# Patient Record
Sex: MALE | Race: BLACK OR AFRICAN AMERICAN | Employment: FULL TIME | URBAN - METROPOLITAN AREA
[De-identification: names, ages, dates, MRNs, and addresses within clinical notes are randomized per-mention and may not be internally consistent; named-entity substitution may affect disease eponyms.]

---

## 2019-06-25 RX ORDER — LIDOCAINE 40 MG/G
CREAM TOPICAL
Status: CANCELLED | OUTPATIENT
Start: 2019-06-25

## 2019-06-25 RX ORDER — ONDANSETRON 2 MG/ML
4 INJECTION INTRAMUSCULAR; INTRAVENOUS
Status: CANCELLED | OUTPATIENT
Start: 2019-06-25

## 2019-07-29 ENCOUNTER — HOSPITAL ENCOUNTER (OUTPATIENT)
Facility: CLINIC | Age: 59
Discharge: HOME | End: 2019-07-29
Attending: COLON & RECTAL SURGERY | Admitting: COLON & RECTAL SURGERY
Payer: COMMERCIAL

## 2019-07-29 VITALS
OXYGEN SATURATION: 97 % | RESPIRATION RATE: 61 BRPM | SYSTOLIC BLOOD PRESSURE: 110 MMHG | WEIGHT: 190 LBS | DIASTOLIC BLOOD PRESSURE: 83 MMHG | HEART RATE: 52 BPM | HEIGHT: 74 IN | BODY MASS INDEX: 24.38 KG/M2

## 2019-07-29 LAB — COLONOSCOPY: NORMAL

## 2019-07-29 PROCEDURE — 45385 COLONOSCOPY W/LESION REMOVAL: CPT | Performed by: COLON & RECTAL SURGERY

## 2019-07-29 PROCEDURE — 88305 TISSUE EXAM BY PATHOLOGIST: CPT | Mod: 26 | Performed by: COLON & RECTAL SURGERY

## 2019-07-29 PROCEDURE — 99153 MOD SED SAME PHYS/QHP EA: CPT | Performed by: COLON & RECTAL SURGERY

## 2019-07-29 PROCEDURE — G0500 MOD SEDAT ENDO SERVICE >5YRS: HCPCS | Performed by: COLON & RECTAL SURGERY

## 2019-07-29 PROCEDURE — 88305 TISSUE EXAM BY PATHOLOGIST: CPT | Performed by: COLON & RECTAL SURGERY

## 2019-07-29 PROCEDURE — 25000128 H RX IP 250 OP 636: Performed by: COLON & RECTAL SURGERY

## 2019-07-29 RX ORDER — FENTANYL CITRATE 50 UG/ML
INJECTION, SOLUTION INTRAMUSCULAR; INTRAVENOUS PRN
Status: DISCONTINUED | OUTPATIENT
Start: 2019-07-29 | End: 2019-07-29 | Stop reason: HOSPADM

## 2019-07-29 RX ORDER — DIPHENHYDRAMINE HCL 25 MG
25 CAPSULE ORAL EVERY 4 HOURS PRN
Status: CANCELLED | OUTPATIENT
Start: 2019-07-29

## 2019-07-29 RX ORDER — LIDOCAINE 40 MG/G
CREAM TOPICAL
Status: DISCONTINUED | OUTPATIENT
Start: 2019-07-29 | End: 2019-07-29 | Stop reason: HOSPADM

## 2019-07-29 RX ORDER — NALOXONE HYDROCHLORIDE 0.4 MG/ML
.1-.4 INJECTION, SOLUTION INTRAMUSCULAR; INTRAVENOUS; SUBCUTANEOUS
Status: CANCELLED | OUTPATIENT
Start: 2019-07-29 | End: 2019-07-30

## 2019-07-29 RX ORDER — CHOLECALCIFEROL (VITAMIN D3) 50 MCG
1 TABLET ORAL DAILY
COMMUNITY
End: 2020-07-24

## 2019-07-29 RX ORDER — FLUMAZENIL 0.1 MG/ML
0.2 INJECTION, SOLUTION INTRAVENOUS
Status: CANCELLED | OUTPATIENT
Start: 2019-07-29 | End: 2019-07-29

## 2019-07-29 RX ORDER — DIPHENHYDRAMINE HYDROCHLORIDE 50 MG/ML
25 INJECTION INTRAMUSCULAR; INTRAVENOUS EVERY 4 HOURS PRN
Status: CANCELLED | OUTPATIENT
Start: 2019-07-29

## 2019-07-29 RX ORDER — MONTELUKAST SODIUM 10 MG/1
10 TABLET ORAL AT BEDTIME
COMMUNITY
End: 2020-07-30

## 2019-07-29 RX ORDER — PROCHLORPERAZINE MALEATE 10 MG
10 TABLET ORAL EVERY 6 HOURS PRN
Status: CANCELLED | OUTPATIENT
Start: 2019-07-29

## 2019-07-29 RX ORDER — ONDANSETRON 2 MG/ML
4 INJECTION INTRAMUSCULAR; INTRAVENOUS
Status: DISCONTINUED | OUTPATIENT
Start: 2019-07-29 | End: 2019-07-29 | Stop reason: HOSPADM

## 2019-07-29 RX ORDER — ONDANSETRON 4 MG/1
4 TABLET, ORALLY DISINTEGRATING ORAL EVERY 6 HOURS PRN
Status: CANCELLED | OUTPATIENT
Start: 2019-07-29

## 2019-07-29 RX ORDER — ONDANSETRON 2 MG/ML
4 INJECTION INTRAMUSCULAR; INTRAVENOUS EVERY 6 HOURS PRN
Status: CANCELLED | OUTPATIENT
Start: 2019-07-29

## 2019-07-29 ASSESSMENT — MIFFLIN-ST. JEOR: SCORE: 1761.58

## 2019-07-29 NOTE — OP NOTE
See Provation Note In Chart    Valorie Holt MD  [facility]  Office Phone # [phone]

## 2019-07-29 NOTE — H&P
"Pre-Endoscopy History and Physical     Stefano Merino MRN# [id number]   YOB: 1960 Age: 58 year old     Date of Procedure: 7/29/2019  Primary care provider: [facility], [facility]  Type of Endoscopy: Colonoscopy  Reason for Procedure: H/O polyps  Type of Anesthesia Anticipated: Moderate Sedation    HPI:    Stefano is a 58 year old male who will be undergoing the above procedure.      A history and physical has been performed. The patient's medications and allergies have been reviewed. The risks and benefits of the procedure and the sedation options and risks were discussed with the patient.  All questions were answered and informed consent was obtained.      He denies a personal or family history of anesthesia complications or bleeding disorders.     No Known Allergies       No current facility-administered medications on file prior to encounter.   Current Outpatient Medications on File Prior to Encounter:  montelukast (SINGULAIR) 10 MG tablet Take 10 mg by mouth At Bedtime   vitamin D3 (CHOLECALCIFEROL) 2000 units (50 mcg) tablet Take 1 tablet by mouth daily       There is no problem list on file for this patient.       Past Medical History:   Diagnosis Date     Colon polyps      Seasonal allergies         Past Surgical History:   Procedure Laterality Date     CHOLECYSTECTOMY      hx polyps     SOFT TISSUE SURGERY      benign lump removed from leg       Social History     Tobacco Use     Smoking status: Never Smoker     Smokeless tobacco: Current User   Substance Use Topics     Alcohol use: Yes     Comment: 1/week       Family History   Problem Relation Age of Onset     Breast Cancer Mother 75       REVIEW OF SYSTEMS:     5 point ROS negative except as noted above in HPI, including Gen., Resp., CV, GI &  system review.      PHYSICAL EXAM:   /80   Resp 16   Ht 1.88 m (6' 2\")   Wt 86.2 kg (190 lb)   SpO2 98%   BMI 24.39 kg/m   Estimated body mass index is 24.39 kg/m  as calculated from " "the following:    Height as of this encounter: 1.88 m (6' 2\").    Weight as of this encounter: 86.2 kg (190 lb).   GENERAL APPEARANCE: healthy and alert  MENTAL STATUS: alert  AIRWAY EXAM: Mallampatti Class I (visualization of the soft palate, fauces, uvula, anterior and posterior pillars)  RESP: lungs clear to auscultation - no rales, rhonchi or wheezes  CV: regular rates and rhythm      IMPRESSION   ASA Class 1 - Healthy patient, no medical problems        PLAN:     Plan for colonoscopy. We discussed the risks, benefits and alternatives and the patient wished to proceed.    The above has been forwarded to the consulting provider.      Valorie Holt MD  [facility]  Phone: [phone]  Fax: [phone]  July 29, 2019    "

## 2019-07-30 LAB — COPATH REPORT: NORMAL

## 2020-04-10 ENCOUNTER — TRANSFERRED RECORDS (OUTPATIENT)
Dept: HEALTH INFORMATION MANAGEMENT | Facility: CLINIC | Age: 60
End: 2020-04-10

## 2020-07-24 ENCOUNTER — VIRTUAL VISIT (OUTPATIENT)
Dept: INTERNAL MEDICINE | Facility: CLINIC | Age: 60
End: 2020-07-24
Payer: COMMERCIAL

## 2020-07-24 DIAGNOSIS — Z00.00 PREVENTATIVE HEALTH CARE: Primary | ICD-10-CM

## 2020-07-24 RX ORDER — DIPHENHYDRAMINE HCL 25 MG
25 CAPSULE ORAL EVERY 6 HOURS PRN
COMMUNITY
End: 2022-03-21

## 2020-07-24 RX ORDER — SILDENAFIL 100 MG/1
50 TABLET, FILM COATED ORAL
COMMUNITY
Start: 2020-02-03 | End: 2020-07-30

## 2020-07-24 RX ORDER — ALBUTEROL SULFATE 90 UG/1
AEROSOL, METERED RESPIRATORY (INHALATION)
COMMUNITY
Start: 2020-04-24 | End: 2022-03-21

## 2020-07-24 ASSESSMENT — ENCOUNTER SYMPTOMS
COUGH: 0
COUGH DISTURBING SLEEP: 0
COUGH DISTURBING SLEEP: 0
SHORTNESS OF BREATH: 1
NAIL CHANGES: 0
POSTURAL DYSPNEA: 0
COUGH DISTURBING SLEEP: 0
SPUTUM PRODUCTION: 0
SHORTNESS OF BREATH: 1
COUGH: 0
HEMOPTYSIS: 0
SNORES LOUDLY: 0
NAIL CHANGES: 0
HEMOPTYSIS: 0
WHEEZING: 0
SKIN CHANGES: 0
SNORES LOUDLY: 0
WHEEZING: 0
NAIL CHANGES: 0
HEMOPTYSIS: 0
COUGH DISTURBING SLEEP: 0
SKIN CHANGES: 0
POOR WOUND HEALING: 0
POOR WOUND HEALING: 0
SNORES LOUDLY: 0
POOR WOUND HEALING: 0
HEMOPTYSIS: 0
POSTURAL DYSPNEA: 0
DYSPNEA ON EXERTION: 0
SKIN CHANGES: 0
SHORTNESS OF BREATH: 1
SPUTUM PRODUCTION: 0
DYSPNEA ON EXERTION: 0
DYSPNEA ON EXERTION: 0
POSTURAL DYSPNEA: 0
COUGH: 0
POSTURAL DYSPNEA: 0
SPUTUM PRODUCTION: 0
SNORES LOUDLY: 0
SPUTUM PRODUCTION: 0
SHORTNESS OF BREATH: 1
COUGH: 0
DYSPNEA ON EXERTION: 0
POOR WOUND HEALING: 0
SKIN CHANGES: 0
WHEEZING: 0
WHEEZING: 0
NAIL CHANGES: 0

## 2020-07-24 ASSESSMENT — ANXIETY QUESTIONNAIRES
2. NOT BEING ABLE TO STOP OR CONTROL WORRYING: NOT AT ALL
GAD7 TOTAL SCORE: 0
1. FEELING NERVOUS, ANXIOUS, OR ON EDGE: NOT AT ALL
3. WORRYING TOO MUCH ABOUT DIFFERENT THINGS: NOT AT ALL
6. BECOMING EASILY ANNOYED OR IRRITABLE: NOT AT ALL
GAD7 TOTAL SCORE: 0
4. TROUBLE RELAXING: NOT AT ALL
6. BECOMING EASILY ANNOYED OR IRRITABLE: NOT AT ALL
GAD7 TOTAL SCORE: 0
GAD7 TOTAL SCORE: 0
7. FEELING AFRAID AS IF SOMETHING AWFUL MIGHT HAPPEN: NOT AT ALL
2. NOT BEING ABLE TO STOP OR CONTROL WORRYING: NOT AT ALL
5. BEING SO RESTLESS THAT IT IS HARD TO SIT STILL: NOT AT ALL
4. TROUBLE RELAXING: NOT AT ALL
1. FEELING NERVOUS, ANXIOUS, OR ON EDGE: NOT AT ALL
7. FEELING AFRAID AS IF SOMETHING AWFUL MIGHT HAPPEN: NOT AT ALL
7. FEELING AFRAID AS IF SOMETHING AWFUL MIGHT HAPPEN: NOT AT ALL
3. WORRYING TOO MUCH ABOUT DIFFERENT THINGS: NOT AT ALL
5. BEING SO RESTLESS THAT IT IS HARD TO SIT STILL: NOT AT ALL
7. FEELING AFRAID AS IF SOMETHING AWFUL MIGHT HAPPEN: NOT AT ALL

## 2020-07-24 NOTE — PROGRESS NOTES
Instructions prior to appointment:   1. Fast beginning at 10 pm for lab appointment  2. If your preventive care assessment package includes a Fitness Assessment, please bring athletic shoes. Complementary [facility] fitness attire is provided and yours to keep.  3. If eye exam, eyes may be dilated, it will last 4-6 hours, may want to bring sunglasses.   4. May bring laptop or other work materials for use during downtime.   5. You will receive an email about 3 days prior to your visit with a final itinerary, menu selections for the complementary breakfast and lunch and instructions for the visit.     Complimentary  Parking provided. Drop off car in front of [facility], take the patient elevators to the [facility]. When you enter in the lobby, identify yourself as an [facility] [atient and you will be escorted up to the clinic.   If questions arise prior to your appointment please contact the clinic at [phone].      Health Maintenance:  Do you have a PCP? Yes  When was your last visit with your PCP? 3 months  When was your last eye exam? 1 year ago  Have you ever had a colonoscopy? Yes   If yes, when? 7 years  Have you ever had any polyps removed? Yes       As part of your visit we will set up a DEXA scan which will measure your body composition. We have a few questions that need to be answered before we can schedule this scan:   What is your approximate weight? 197   Have you ever had a DEXA scan within the past 2 years? No   Will you have any other imaging studies with contrast (x-ray, CT scan) within 7  days of this appointment? No   Have you had any spine or hip surgery? No   Do you take any vitamins that contain calcium or antacids with calcium? No    If yes, stop taking 24 hours prior to visit.     Goals for the Visit:  1. Antibody Covid test  2. Comprehensive Preventative Exam  3. Cramps in legs and feet  4. Sleep concerns  Pertinent  past Medical/Family and Social HX: sister depression,   Pertinent sx that desire are addressed with this visit:           Answers for HPI/ROS submitted by the patient on 7/24/2020   SANJANA 7 TOTAL SCORE: 0  General Symptoms: No  Skin Symptoms: Yes  HENT Symptoms: No  EYE SYMPTOMS: No  HEART SYMPTOMS: No  LUNG SYMPTOMS: Yes  INTESTINAL SYMPTOMS: No  URINARY SYMPTOMS: No  REPRODUCTIVE SYMPTOMS: No  SKELETAL SYMPTOMS: No  BLOOD SYMPTOMS: No  NERVOUS SYSTEM SYMPTOMS: No  MENTAL HEALTH SYMPTOMS: No  Changes in hair: No  Changes in moles/birth marks: No  Itching: Yes  Rashes: No  Changes in nails: No  Acne: No  Change in facial hair: No  Warts: No  Non-healing sores: No  Scarring: No  Flaking of skin: No  Color changes of hands/feet in cold : No  Sun sensitivity: No  Skin thickening: No  Cough: No  Sputum or phlegm: No  Coughing up blood: No  Difficulty breating or shortness of breath: Yes  Snoring: No  Wheezing: No  Difficulty breathing on exertion: No  Nighttime Cough: No  Difficulty breathing when lying flat: No

## 2020-07-25 ASSESSMENT — ANXIETY QUESTIONNAIRES
GAD7 TOTAL SCORE: 0
GAD7 TOTAL SCORE: 0

## 2020-07-29 DIAGNOSIS — Z29.9 PREVENTIVE MEASURE: Primary | ICD-10-CM

## 2020-07-29 PROBLEM — E78.5 DYSLIPIDEMIA: Status: ACTIVE | Noted: 2020-07-29

## 2020-07-29 PROBLEM — J30.9 ALLERGIC RHINITIS: Status: ACTIVE | Noted: 2020-07-29

## 2020-07-29 PROBLEM — N52.9 ERECTILE DYSFUNCTION OF ORGANIC ORIGIN: Status: ACTIVE | Noted: 2019-07-26

## 2020-07-30 ENCOUNTER — OFFICE VISIT (OUTPATIENT)
Dept: DERMATOLOGY | Facility: CLINIC | Age: 60
End: 2020-07-30
Payer: COMMERCIAL

## 2020-07-30 ENCOUNTER — OFFICE VISIT (OUTPATIENT)
Dept: INTERNAL MEDICINE | Facility: CLINIC | Age: 60
End: 2020-07-30
Payer: COMMERCIAL

## 2020-07-30 ENCOUNTER — APPOINTMENT (OUTPATIENT)
Dept: INTERNAL MEDICINE | Facility: CLINIC | Age: 60
End: 2020-07-30
Payer: COMMERCIAL

## 2020-07-30 ENCOUNTER — OFFICE VISIT (OUTPATIENT)
Dept: OPHTHALMOLOGY | Facility: CLINIC | Age: 60
End: 2020-07-30
Payer: COMMERCIAL

## 2020-07-30 ENCOUNTER — OFFICE VISIT (OUTPATIENT)
Dept: GASTROENTEROLOGY | Facility: CLINIC | Age: 60
End: 2020-07-30
Payer: COMMERCIAL

## 2020-07-30 ENCOUNTER — OFFICE VISIT (OUTPATIENT)
Dept: AUDIOLOGY | Facility: CLINIC | Age: 60
End: 2020-07-30
Payer: COMMERCIAL

## 2020-07-30 ENCOUNTER — ANCILLARY PROCEDURE (OUTPATIENT)
Dept: BONE DENSITY | Facility: CLINIC | Age: 60
End: 2020-07-30
Payer: COMMERCIAL

## 2020-07-30 VITALS
HEART RATE: 65 BPM | WEIGHT: 195 LBS | HEIGHT: 74 IN | BODY MASS INDEX: 25.03 KG/M2 | SYSTOLIC BLOOD PRESSURE: 120 MMHG | DIASTOLIC BLOOD PRESSURE: 82 MMHG | RESPIRATION RATE: 18 BRPM | OXYGEN SATURATION: 98 %

## 2020-07-30 DIAGNOSIS — H52.13 MYOPIA OF BOTH EYES: Primary | ICD-10-CM

## 2020-07-30 DIAGNOSIS — Z00.00 PREVENTATIVE HEALTH CARE: ICD-10-CM

## 2020-07-30 DIAGNOSIS — Z01.10 EXAMINATION OF EARS AND HEARING: Primary | ICD-10-CM

## 2020-07-30 DIAGNOSIS — R09.89 AIR HUNGER: ICD-10-CM

## 2020-07-30 DIAGNOSIS — R05.9 COUGH: ICD-10-CM

## 2020-07-30 DIAGNOSIS — Z71.82 EXERCISE COUNSELING: Primary | ICD-10-CM

## 2020-07-30 DIAGNOSIS — J30.2 SEASONAL ALLERGIC RHINITIS, UNSPECIFIED TRIGGER: ICD-10-CM

## 2020-07-30 DIAGNOSIS — Z98.890 HX OF LASIK: ICD-10-CM

## 2020-07-30 DIAGNOSIS — Z29.9 PREVENTIVE MEASURE: ICD-10-CM

## 2020-07-30 DIAGNOSIS — Z12.83 SKIN CANCER SCREENING: Primary | ICD-10-CM

## 2020-07-30 DIAGNOSIS — R06.09 OTHER FORM OF DYSPNEA: ICD-10-CM

## 2020-07-30 DIAGNOSIS — Z00.00 ENCOUNTER FOR PREVENTIVE CARE: Primary | ICD-10-CM

## 2020-07-30 DIAGNOSIS — L30.9 DERMATITIS: ICD-10-CM

## 2020-07-30 DIAGNOSIS — I51.7 LAE (LEFT ATRIAL ENLARGEMENT): ICD-10-CM

## 2020-07-30 DIAGNOSIS — H25.093 AGE-RELATED INCIPIENT CATARACT OF BOTH EYES: ICD-10-CM

## 2020-07-30 LAB
ALBUMIN UR-MCNC: NEGATIVE MG/DL
ALP SERPL-CCNC: 54 U/L (ref 40–150)
ALT SERPL W P-5'-P-CCNC: 34 U/L (ref 0–70)
APPEARANCE UR: CLEAR
BASOPHILS # BLD AUTO: 0 10E9/L (ref 0–0.2)
BASOPHILS NFR BLD AUTO: 0.6 %
BILIRUB UR QL STRIP: NEGATIVE
CHOLEST SERPL-MCNC: 209 MG/DL
COLOR UR AUTO: YELLOW
CREAT SERPL-MCNC: 0.98 MG/DL (ref 0.66–1.25)
DEPRECATED CALCIDIOL+CALCIFEROL SERPL-MC: 39 UG/L (ref 20–75)
DIFFERENTIAL METHOD BLD: NORMAL
EOSINOPHIL # BLD AUTO: 0.4 10E9/L (ref 0–0.7)
EOSINOPHIL NFR BLD AUTO: 5.4 %
ERYTHROCYTE [DISTWIDTH] IN BLOOD BY AUTOMATED COUNT: 11.8 % (ref 10–15)
GFR SERPL CREATININE-BSD FRML MDRD: 85 ML/MIN/{1.73_M2}
GLUCOSE SERPL-MCNC: 95 MG/DL (ref 70–99)
GLUCOSE UR STRIP-MCNC: NEGATIVE MG/DL
HCT VFR BLD AUTO: 46.3 % (ref 40–53)
HCV AB SERPL QL IA: NONREACTIVE
HDLC SERPL-MCNC: 57 MG/DL
HGB BLD-MCNC: 15.4 G/DL (ref 13.3–17.7)
HGB UR QL STRIP: NEGATIVE
HIV 1+2 AB+HIV1 P24 AG SERPL QL IA: NONREACTIVE
IMM GRANULOCYTES # BLD: 0 10E9/L (ref 0–0.4)
IMM GRANULOCYTES NFR BLD: 0.1 %
INTERPRETATION ECG - MUSE: NORMAL
KETONES UR STRIP-MCNC: NEGATIVE MG/DL
LDLC SERPL CALC-MCNC: 137 MG/DL
LEUKOCYTE ESTERASE UR QL STRIP: NEGATIVE
LYMPHOCYTES # BLD AUTO: 1.4 10E9/L (ref 0.8–5.3)
LYMPHOCYTES NFR BLD AUTO: 19.5 %
MCH RBC QN AUTO: 29.8 PG (ref 26.5–33)
MCHC RBC AUTO-ENTMCNC: 33.3 G/DL (ref 31.5–36.5)
MCV RBC AUTO: 90 FL (ref 78–100)
MONOCYTES # BLD AUTO: 0.5 10E9/L (ref 0–1.3)
MONOCYTES NFR BLD AUTO: 6.5 %
NEUTROPHILS # BLD AUTO: 4.8 10E9/L (ref 1.6–8.3)
NEUTROPHILS NFR BLD AUTO: 67.9 %
NITRATE UR QL: NEGATIVE
NONHDLC SERPL-MCNC: 152 MG/DL
NRBC # BLD AUTO: 0 10*3/UL
NRBC BLD AUTO-RTO: 0 /100
PH UR STRIP: 7 PH (ref 5–7)
PLATELET # BLD AUTO: 235 10E9/L (ref 150–450)
PSA SERPL-ACNC: 0.94 UG/L (ref 0–4)
RBC # BLD AUTO: 5.16 10E12/L (ref 4.4–5.9)
RBC #/AREA URNS AUTO: <1 /HPF (ref 0–2)
SOURCE: NORMAL
SP GR UR STRIP: 1.02 (ref 1–1.03)
SQUAMOUS #/AREA URNS AUTO: <1 /HPF (ref 0–1)
TRIGL SERPL-MCNC: 73 MG/DL
TSH SERPL DL<=0.005 MIU/L-ACNC: 1.28 MU/L (ref 0.4–4)
UROBILINOGEN UR STRIP-MCNC: 0 MG/DL (ref 0–2)
WBC # BLD AUTO: 7.1 10E9/L (ref 4–11)
WBC #/AREA URNS AUTO: <1 /HPF (ref 0–5)

## 2020-07-30 RX ORDER — TRIAMCINOLONE ACETONIDE 0.25 MG/G
OINTMENT TOPICAL 2 TIMES DAILY
Qty: 80 G | Refills: 0 | Status: SHIPPED | OUTPATIENT
Start: 2020-07-30 | End: 2020-09-25

## 2020-07-30 RX ORDER — MONTELUKAST SODIUM 10 MG/1
10 TABLET ORAL AT BEDTIME
Qty: 30 TABLET | Refills: 2 | Status: SHIPPED | OUTPATIENT
Start: 2020-07-30 | End: 2021-07-07

## 2020-07-30 RX ORDER — TADALAFIL 20 MG/1
20 TABLET ORAL DAILY PRN
COMMUNITY
Start: 2020-07-30 | End: 2023-05-09

## 2020-07-30 ASSESSMENT — VISUAL ACUITY
OS_SC: 20/40
OS_PH_SC+: -
OD_SC: 20/50
OD_PH_SC: 20/40
OS_PH_SC: 20/30
METHOD: SNELLEN - LINEAR

## 2020-07-30 ASSESSMENT — REFRACTION_WEARINGRX
OS_CYLINDER: +0.25
OD_CYLINDER: SPHERE
OS_AXIS: 155
OS_SPHERE: -0.25
SPECS_TYPE: SVL
OD_SPHERE: -0.50

## 2020-07-30 ASSESSMENT — CUP TO DISC RATIO
OS_RATIO: 0.2
OD_RATIO: 0.2

## 2020-07-30 ASSESSMENT — REFRACTION_MANIFEST
OD_ADD: +1.50
OD_CYLINDER: SPHERE
OS_SPHERE: -0.50
OS_ADD: +1.50
OS_CYLINDER: SPHERE
OD_SPHERE: -0.75

## 2020-07-30 ASSESSMENT — TONOMETRY
IOP_METHOD: ICARE
OD_IOP_MMHG: 12
OS_IOP_MMHG: 13

## 2020-07-30 ASSESSMENT — EXTERNAL EXAM - LEFT EYE: OS_EXAM: NORMAL

## 2020-07-30 ASSESSMENT — CONF VISUAL FIELD
OS_NORMAL: 1
METHOD: COUNTING FINGERS
OD_NORMAL: 1

## 2020-07-30 ASSESSMENT — PAIN SCALES - GENERAL
PAINLEVEL: NO PAIN (0)
PAINLEVEL: NO PAIN (0)

## 2020-07-30 ASSESSMENT — EXTERNAL EXAM - RIGHT EYE: OD_EXAM: NORMAL

## 2020-07-30 ASSESSMENT — SLIT LAMP EXAM - LIDS
COMMENTS: NORMAL
COMMENTS: NORMAL

## 2020-07-30 ASSESSMENT — MIFFLIN-ST. JEOR: SCORE: 1773.26

## 2020-07-30 NOTE — LETTER
7/30/2020       RE: Stefano Merino  [address]  [place] [address]     Dear Colleague,    Thank you for referring your patient, Stefano Merino, to the [facility] DERMATOLOGY at [facility]. Please see a copy of my visit note below.    [facility] Dermatology Note      Dermatology Problem List:  1.Dermatitis - right forearm - triamcinolone 0.025% cream    CC:   Chief Complaint   Patient presents with     Skin Check     Stefano is here for a skin check-one spot of concern on right arm.      Encounter Date: Jul 30, 2020    History of Present Illness:  Mr. Stefano Merino is a 59 year old male who presents for a skin exam. He is a new patient. He has one spot of concern on the R arm. Notes it began as a probably bug bite about 1 mo ago. It is now an itchy patch on the R forearm. He has tried benadryl pills and cream, as well as vaseline but does not help a lot. He has not been super consistent with it. No hx of eczema. Does have seasonal allergies. No personal or fhx of skin cancer. He has no other spots or moles of concern he is well otherwise with no additional skin concerns. The patient denies painful, itching, tingling or bleeding lesions unless otherwise noted.    Past Medical History:   Patient Active Problem List   Diagnosis     Erectile dysfunction of organic origin     Allergic rhinitis     Dyslipidemia     Past Medical History:   Diagnosis Date     Colon polyps      Seasonal allergies      Past Surgical History:   Procedure Laterality Date     CHOLECYSTECTOMY      hx polyps     SOFT TISSUE SURGERY      benign lump removed from leg       Social History:  The patient works. Has children    Family History:  There is no family history of skin cancer. and There is no family history of melanoma.    Medications:  Current Outpatient Medications   Medication Sig Dispense Refill     albuterol (VENTOLIN HFA) 108 (90 Base) MCG/ACT inhaler        Cholecalciferol  (VITAMIN D3) 25 MCG (1000 UT) CAPS Take 1,000 Units by mouth       diphenhydrAMINE (BENADRYL) 25 MG capsule Take 25 mg by mouth every 6 hours as needed for itching or allergies       fluticasone-vilanterol (BREO ELLIPTA) 100-25 MCG/INH inhaler Inhale 1 puff into the lungs       montelukast (SINGULAIR) 10 MG tablet Take 10 mg by mouth At Bedtime       sildenafil (VIAGRA) 100 MG tablet Take 50 mg by mouth       No Known Allergies      Review of Systems:  -Constitutional: The patient denies fatigue, fevers, chills, unintended weight loss, and night sweats.  -HEENT: Patient denies nonhealing oral sores.  -Skin: As above in HPI. No additional skin concerns.    Physical exam:  Vitals: There were no vitals taken for this visit.  GEN: This is a well developed, well-nourished male in no acute distress, in a pleasant mood.    SKIN: Full skin, which includes the head/face, both arms, chest, back, abdomen,both legs, genitalia and/or groin buttocks, digits and/or nails, was examined.  -There are pink scaly patches and plaques on the right volar forearm.  -Bailey's skin type III, less than 100 nevi  -No other lesions of concern on areas examined.     Impression/Plan:  1. 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 Edu on steroid atrophy given as well.   -Cerave/Cetaphil moisturizer BID    2. Skin Cancer Screening  -ABCDs of melanoma were discussed and self skin checks were advised.   -Sun precaution was advised including the use of sun screens of SPF 30 or higher, sun protective clothing, and avoidance of tanning beds.  -Recommended annual skin exams    CC Dr. Newton on close of this encounter.  Follow-up in 1 year, earlier for new or changing lesions.       Staff Involved:  Staff Only  All risks, benefits and alternatives were  discussed with patient.  Patient is in agreement and understands the assessment and plan.  All questions were answered.    Dana Martin PA-C, [facility]  [facility]: Phone: [phone], Fax: [phone]  [facility]: Phone: [phone],  Fax: [phone]    Again, thank you for allowing me to participate in the care of your patient.      Sincerely,    Dana Martin PA-C

## 2020-07-30 NOTE — PATIENT INSTRUCTIONS
It was nice meeting you today. Below are the nutrition recommendations we discussed at your visit today.  Please let me know if you have any additional questions.    Nutrition Recommendations:    1. Continue eating 3 balanced meals per day.    2. Limit snacking and only have a planned snack when going a long stretch between meals and/or if truly hungry.    -To help with some weight loss/maintaining weight, can start with limiting the number of nights you have frozen yogurt at night to help cut back on some calories.    -Can try having a caffeine free herbal tea such as Tazo Glazed lemon loaf tea (only has a few calories and can be in place of dessert).     3. Increase water to aim for drinking at least 6-8 cups (48 oz-64 oz) water per day.   -Note: carbonated beverages/medrano do not provide oz for oz what still water does because the bubbles are taking up some volume.     4. Can keep daily food and beverage journals using a cell phone flaquito such as [employer] Fitness pal or Lose it or other tracking flaquito or online option of your choice. This will help give you a good picture of your intake day to day and help make any adjustments as you try to lose/maintain weight.    Lauren Crane, MS, RD, LD

## 2020-07-30 NOTE — LETTER
"    7/30/2020         RE: Stefano Merino  [address]  [place] [address]        Dear Colleague,    Thank you for referring your patient, Stefano Merino, to the [facility] GASTROENTEROLOGY AND IBD CLINIC. Please see a copy of my visit note below.     [facility] Outpatient Medical Nutrition Therapy      Time Spent:  60 minuts  Session Type:  Initial visit  Referral Source: [employer] Package/Dr. Kyle Floyd  Reason for RD Visit:   Nutritional counseling     Nutrition Assessment:  Patient is a 59 y.o. male with history of colon polyps, seasonal allergies.  Patient stated that he overall feels like he has a very healthy diet.  He stated that one issue is his cholesterol may be a little bit higher.  He does not have a lot of specific nutrition goals at visit today.  Although he would like to lose so interested in general nutrition recommendations as well as some to help with weight loss/management.  He stated that 2-3 nights per week he is away from home traveling for work and on those nights he may eat more past eat out more.  Otherwise use 3 meals a day about 2-3 snacks each day [employer] of frozen yogurt sometimes adds blueberries onto it other times may only have blueberries.  Admits drinking about 48 ounces of water.  He also complains of issues with leg cramps so will drink some tonic water. He also has some insomnia so he tries not to drink his Perlita antioxidant drinks in the evening or too close to bedtime since they have caffeine. See diet recall below.     Height:   Ht Readings from Last 1 Encounters:   07/29/19 1.88 m (6' 2\")     Weight:  195 lbs (88.5 kg)      BMI: 25.29    Diet Recall:  (some usual meals):   Meal Food    Breakfast Greek yogurt with granola, craisins and a homemade smoothie made with banana, spinach, almond milk, and sometimes adds lowfat frozen yogurt to it   Lunch 2x/week 6\" or 12\" meatball, cheese and veg sub on white bread OR ~2x/week has a chicken caesar salad or " woods salad OR a couple of times per week a burger with side salad while traveling.   Dinner Shrimp, veggie, pasta stir andersen  OR 2x/week cod sauteed in olive oil/ baked salmon/pork chop sauteed in olive oil/ ribs with vegetables and half the time has white rice or pasta with dinner other half no starch   Snacks Mid-morning: kind bar or  low sugar protein bar, while cooking eats ~60 cashews and after dinner has a very tiny scoop of frozen yogurt or blueberries or frozen yogurt with blueberries   Beverages ~48 oz water, occas sparkling water, while traveling might have 100% cranberry juice and sometimes has tart cherry juice or Perlita antioxidant drink. ~3x/week will have black tea in the morning. Drinks some tonic water at dinner unless traveling.         Frequency of eating/taking out meals: has to travel 2-3 days/night week so has to eat out at those times + about 2x/week for lunch as subway.     Labs:  On 2020: chol 209, , non . Others reviewed in EMR.   Pertinent Medications/vitamin and mineral supplements:  Vitamin D. Reviewed others in EMR.   Food Allergies:  NKFA  Physical Activity:  4 times per week goes running for 40 minutes. Occasionally uses weight when was going to his gym but not currently using gym.    Estimated Nutrition Needs based on Ideal body weight of 81 k-2430 calories (25-30 kcals/kg), 81-97g protein (1-1.2g/kg), ~1 ml/kcal for fluids or total fluids per MD.     Nutrition Diagnosis:    Food and nutrition related knowledge deficit related to lack of previous diet recall/complete recall of previous diet education as evidenced by pt report and interest in diet education.    Nutrition Prescription: Recommended general healthful diet     Nutrition Intervention:    Nutrition Education/Counseling:  Provided general healthful diet nutrition education with tips and suggestions. Reviewed food groups with some example foods in groups. Reviewed the difference between  unsaturated and saturated fats with recommendation to aim for choosing unsaturated fat over saturated fats and reviewed some sources of both saturated and unsaturated fats. Encouraged patient to eat adequate fruit and vegetable servings per day (at least 5 servings but explained recommendation to aim for 9-11 servings per day). Discussed adequate hydration/recommendations and encouraged pt to increase water to consistently drink at least 64 oz (8 cups) per day. Told pt okay add lemon/lime to water or can flavor with cucumber slice or fresh herbs/fruit for example to naturally flavor water. Discussed some weight loss tips and encouraged pt to limit snacking especially after dinner unless truly hungry. Discussed some tips if pt wanted something sweet such as having a herbal dessert type tea like Tazo lemon loaf cake or just having some blueberries for dessert instead. encourged pt to limit grazing/snacking on cashews while cooking and instead recommended portioning out about 12-15 nuts and eating as a planned snack before he starts cooking or in the afternoon at work. Reviewed some of pt's usual packaged foods and beverages and gave tips and answered his questions re: added sugars and label reading. Answered patient's questions. Patient verbalized understanding of education provided. Provided pt with RD contact information and list of goals below.     Educational Materials Provided:  [employer] Daily Nutrition Plate method handout     Goals:  1. Continue eating 3 balanced meals per day.    2. Limit snacking to have a planned snack when going a long stretch between meals and/or if truly hungry.    -To help with some weight loss/maintaining weight, can start with limiting the number of nights you have frozen yogurt at night to help cut back on some calories.    -Can try having a caffeine free herbal tea such as Tazo Glazed lemon loaf tea (only has a few calories and can be in place of dessert).     3. Increase water to  aim for drinking at least 6-8 cups (48 oz-64 oz) per day. Note: carbonated beverages/medrano do not provide oz for oz of still water because the bubbles are taking up some volume.     4. Can keep daily food and beverage journals using a cell phone flaquito such as [employer] Fitness pal or Lose it or other tracking flaquito or online option of your choice. This will help give you a good picture of your intake day to day and help make any adjustments as you try to lose/maintain weight.    Nutrition Monitoring and Evaluation: Will monitor adherence to nutrition recommendations at future RD visits.     Further Medical Nutrition Therapy:  Annual visit/PRN  Patient was encouraged to call/contact RD with any further questions.    Lauren Crnae, MS, RD, LD            Again, thank you for allowing me to participate in the care of your patient.        Sincerely,        Lauren Crane RD

## 2020-07-30 NOTE — NURSING NOTE
Chief Complaint   Patient presents with     Physical     Patient is here for annual physical     Digna Trevizo CMA 6:49 AM on 7/30/2020.

## 2020-07-30 NOTE — PROGRESS NOTES
AUDIOLOGY REPORT  [facility] Assessment    SUMMARY: Audiology visit completed. See audiogram for results.      RECOMMENDATIONS: Follow-up with Dr. Floyd.  Repeat hearing evaluation as medically indicated, sooner if concerns arise      Yola Castro  Audiologist  [place] License  #[id number]

## 2020-07-30 NOTE — PROGRESS NOTES
[facility] Dermatology Note      Dermatology Problem List:  1.Dermatitis - right forearm - triamcinolone 0.025% cream    CC:   Chief Complaint   Patient presents with     Skin Check     Stefano is here for a skin check-one spot of concern on right arm.      Encounter Date: Jul 30, 2020    History of Present Illness:  Mr. Stefano Merino is a 59 year old male who presents for a skin exam. He is a new patient. He has one spot of concern on the R arm. Notes it began as a probably bug bite about 1 mo ago. It is now an itchy patch on the R forearm. He has tried benadryl pills and cream, as well as vaseline but does not help a lot. He has not been super consistent with it. No hx of eczema. Does have seasonal allergies. No personal or fhx of skin cancer. He has no other spots or moles of concern he is well otherwise with no additional skin concerns. The patient denies painful, itching, tingling or bleeding lesions unless otherwise noted.    Past Medical History:   Patient Active Problem List   Diagnosis     Erectile dysfunction of organic origin     Allergic rhinitis     Dyslipidemia     Past Medical History:   Diagnosis Date     Colon polyps      Seasonal allergies      Past Surgical History:   Procedure Laterality Date     CHOLECYSTECTOMY      hx polyps     SOFT TISSUE SURGERY      benign lump removed from leg       Social History:  The patient works. Has children    Family History:  There is no family history of skin cancer. and There is no family history of melanoma.    Medications:  Current Outpatient Medications   Medication Sig Dispense Refill     albuterol (VENTOLIN HFA) 108 (90 Base) MCG/ACT inhaler        Cholecalciferol (VITAMIN D3) 25 MCG (1000 UT) CAPS Take 1,000 Units by mouth       diphenhydrAMINE (BENADRYL) 25 MG capsule Take 25 mg by mouth every 6 hours as needed for itching or allergies       fluticasone-vilanterol (BREO ELLIPTA) 100-25 MCG/INH inhaler Inhale 1 puff into the lungs        montelukast (SINGULAIR) 10 MG tablet Take 10 mg by mouth At Bedtime       sildenafil (VIAGRA) 100 MG tablet Take 50 mg by mouth       No Known Allergies      Review of Systems:  -Constitutional: The patient denies fatigue, fevers, chills, unintended weight loss, and night sweats.  -HEENT: Patient denies nonhealing oral sores.  -Skin: As above in HPI. No additional skin concerns.    Physical exam:  Vitals: There were no vitals taken for this visit.  GEN: This is a well developed, well-nourished male in no acute distress, in a pleasant mood.    SKIN: Full skin, which includes the head/face, both arms, chest, back, abdomen,both legs, genitalia and/or groin buttocks, digits and/or nails, was examined.  -There are pink scaly patches and plaques on the right volar forearm.  -Bailey's skin type III, less than 100 nevi  -No other lesions of concern on areas examined.     Impression/Plan:  1. 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 Edu on steroid atrophy given as well.   -Cerave/Cetaphil moisturizer BID    2. Skin Cancer Screening  -ABCDs of melanoma were discussed and self skin checks were advised.   -Sun precaution was advised including the use of sun screens of SPF 30 or higher, sun protective clothing, and avoidance of tanning beds.  -Recommended annual skin exams    CC Dr. Newton on close of this encounter.  Follow-up in 1 year, earlier for new or changing lesions.       Staff Involved:  Staff Only  All risks, benefits and alternatives were discussed with patient.  Patient is in agreement and understands the assessment and plan.  All questions were answered.    Dana Martin PA-C, MPAS  [facility]: Phone: [phone], Fax: [phone]  [facility]  [facility]: Phone: [phone],  Fax: [phone]

## 2020-07-30 NOTE — NURSING NOTE
Chief Complaints and History of Present Illnesses   Patient presents with     Annual Eye Exam     Chief Complaint(s) and History of Present Illness(es)     Annual Eye Exam     Laterality: both eyes    Onset: years ago    Frequency: constantly    Timing: throughout the day    Course: stable    Treatments tried: no treatments    Pain scale: 0/10              Comments     Uses glasses for driving at night occasionally. And OTC readers in low lighting occasionally as well. Patient states vision has been stable since last eye exam, both eyes. Denies eye pain or irritation. Does not take eye drops.    Gloria Ruiz COT 7:34 AM July 30, 2020

## 2020-07-30 NOTE — PROGRESS NOTES
Stefano Merino comes into clinic today at the request of Dr. ANDREW Floyd Ordering Provider for EKG.    This service provided today was under the supervising provider of the day Dr. ANDREW Floyd, who was available if needed.    Digna Trevizo, [facility]

## 2020-07-30 NOTE — PATIENT INSTRUCTIONS
"Stefano,    It was great to meet you today! I think you already have a good fitness routine. With a tweak here and there you can get even more out of it. Here are my recommendations:    1.) Attend fly feet 1 d/wk. I think the interval training and inclusion of more lower body weights will benefit you running.    2.) When lifting, generally lift until \"failure minus 2-3\". If you have more reps \"left in the tank\", either perform a couple more reps or add a little more weight to the next set.    3.) Include some lower body exercises with your upper body lifting days. This may not result in incredible amounts of visible muscle definition, but doing this should make running easier, and thus hopefully more enjoyable.    4.) When doing your standard runs, occasionally perform fartleks or run at a \"threshold\" pace. A threshold pace should get your heart rate to ~150-160 and make it so you can only speak in very short sentences. It should feel hard, yet doable.    Sincerely,    Juan Jose Ramos  "

## 2020-07-30 NOTE — LETTER
7/30/2020      RE: Stefano Merino  [address]  [place] [address]        History and Physical Examination     SUBJECTIVE: Chief complaint: preventive health review.     Past Medical History:  1.  Seasonal (fall) allergic rhinitis  2.  History of adenomatous colonic polyps.  A 3.  Dyslipidemia.  4.  Status post excision of benign tumor, right pretibial region, circa 1990  5.  Status post bilateral LASIK procedures.  6.  Erectile dysfunction.     Adverse Drug Reactions: None.     Current Medications:  Albuterol, 2 puffs 4 times a day as needed  Vitamin D3, 1000 international units daily  Diphenhydramine, 25 mg every 6 hours initiated.  Montelukast, 10 mg daily at bedtime, used as needed.  Tadalafil, 20 mg daily as needed, used approximately twice per week.  Fluticasone-vilantrol, 100-25, 1 puff daily as needed     Habits:  Tobacco: Never  Alcohol: 1-2 servings per week  Caffeine: 3 servings of tea per week     Social History:  (2011) father of 2 children from his 20-year marriage, currently in a mutually monogamous relationship with Dayana, a female partner.  Daughter Kristina, 23, a [employer] graduate who was a competitive swimmer in high school.  Now works in [employer] for [employer]; daughter, Kaley, age 19, who enjoys [employer], will be a sophomore at [employer] in [place].  Stefano is a native of [place] area who attended [employer], where he studied economics, before attending graduate school at [employer].  He moved to the [facility] 2 years ago after spending time in [place] and [place].  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  Away from work, he enjoys golf and other exercise.  Typically he exercises 5 times week, typically running 2-3 times and engaging in yoga or strength training 2-3 times.     Family History:  "Mother is 97, a retired nurse anesthetist with history of breast cancer, hypertension, vertigo, and dementia.  Father  at age 72 from AAA rupture, with no history of tobacco use or known medical problems.  A sister  at approximately age 50 from AIDS.  A 61-year-old sister is a breast cancer survivor.  Daughters are in excellent health.  Paternal grandparents  in their 80s.  Maternal grandparents medical history not known.    Review of Systems:  Intermittent nocturnal lower extremity cramps, typically affecting calves and feet; possibly somewhat better with consumption of tonic water.  No lower extremity cramping noted with exertion.  Mild initial insomnia, managed with diphenhydramine, which also helps treat allergic rhinitis symptoms.  No known snoring, gasping, or daytime somnolence.  Recent mild transient heartburn symptoms when tadalafil used shortly after a meal.  Periodic episodes of persistent coughing and urged to \"sigh\", for which she was prescribed montelukast and inhalers for presumed mild asthma; no history of bronchoprovocation testing.  No symptoms of GERD at other times and specifically no symptoms with exertion.  Most recent colonoscopy negative in 2019.  Most recent tetanus booster administered more than 10 years ago.  No history of zoster vaccination.  Remainder of complete review of systems was negative.     OBJECTIVE:     Vital signs: Height 73.5 inches.  Weight 195 pounds.  Blood pressure 118/83 on average of 3 automated readings.  Heart rate 65.  Respiratory rate 18.  O2 saturation 98% on room air.  General: Alert, neatly dressed and groomed, in no acute distress.  HEENT: Atraumatic and normocephalic. Eyelids, pupils, and conjunctivae appeared normal. Lips, teeth and gums appear normal.  Oropharynx showed moist mucous membranes, without exudate or erythema; slightly \"crowded\" airway.  Neck: Supple, without thyromegaly, mass, or bruit. No cervical or supraclavicular " lymphadenopathy.  Back: No spinal or costovertebral angle tenderness.  Chest: Clear to auscultation and percussion. Normal respiratory effort.  Cardiovascular: No jugular venous distention. Regular rate and rhythm, normal S1, S2 without murmur.  Abdomen: Bowel sounds positive; soft, nontender, without rebound, guarding, hepatosplenomegaly or mass.  Extremities: No cyanosis or edema.  Genitalia: Normal male genitalia, without scrotal mass or hernia. No inguinal lymphadenopathy.  Rectal: Normal tone, with smooth, nontender, minimally enlarged prostate. No rectal mass.  Skin: Examination was deferred; full evaluation was completed earlier in day through dermatology clinic.  Neurologic: Cranial nerves II-XII were grossly intact. Sensory and motor examinations were normal. Normal gait.  Mini-cog score was 4/5 (2/3 words recalled at 5 minutes).  Psychiatric: Alert and oriented ×3. Normal affect. Judgment and insight intact.  SANJANA-7 score was 0.  PH she will-2 score was 0.    Creatinine 0.98, alkaline phosphatase 54, ALT 34, cholesterol 209, HDL 57, , triglycerides 73, PSA 0.94, TSH 1.28, 25-hydroxy vitamin D 39, glucose 95, white blood cell count 7100, hemoglobin 15.4, platelets 235,000, hepatitis C and HIV antibodies nonreactive.  Urinalysis unremarkable.    EKG showed sinus bradycardia with possible left atrial enlargement.  Spirometry showed an FEV1 of 4.05, with an FVC of 4.74; readings were 104% and 95% of predicted values, respectively.    DEXA showed normal bone density, with most negative and valid T-score of -0.7 at the level of the right femoral neck.  Body composition analysis showed 23.1% fat (40th percentile); body mass index was 25.29.     ASSESSMENT:    1.  Idiopathic nocturnal cramps.  No exertional symptoms.  Normal pedal pulses.  He has noted slight improvement with consumption of tonic water.  He was advised that use of quinine water is acceptable but that quinine medication treatment is no longer  "recommended.  He will add stretching exercises at bedtime and pursue further evaluation in the event of persistent symptoms or if exertional symptoms are noted.    2.  Insomnia.  Mild initial insomnia, managed with diphenhydramine.  I recommended that he closely follow usual \"sleep hygiene\" recommendations, which were reviewed at length.  If these measures alone do not lead to resolution of his insomnia, he will consider use of melatonin, 0.3-0.5 mg daily at bedtime; we discussed the fact that available OTC preparations are much higher doses.  We agreed that use of diphenhydramine would be reasonable during his allergy season given dual benefit.  He denies signs or symptoms of sleep apnea, but agrees to pursue further evaluation if his partner notes loud snoring, gasping, or pauses in breathing during sleep.    3.  Dyslipidemia.  Using current readings and guidelines from the AHA/ACC, his estimated 10-year risk of a vascular event is 6.1% (optimal for his cohort is 5.3%).  Since he believes there is room for improvement in his diet, he will work on modifying his diet and repeat fasting cholesterol fractionation in approximately 6 months.  We discussed usual guidelines regarding the threshold for recommendation of statin treatment; since there are non-pharmacologic measures to pursue, he elected to postpone treatment at this time.  We discussed the elements of a healthful diet and he was encouraged to maintain ideal weight, while continuing his regimen of regular exercise.    4.  Possible asthma.  Since diagnosis has been presumptive, I recommended bronchoprovocation studies to confirm the diagnosis.  If it is demonstrated that he has asthma, I would recommend pneumococcal (PPSV 23) vaccination.    5.  Possible LAE.  No evidence of left ventricular hypertrophy on EKG.  Acceptable blood pressure.  I recommended an echocardiogram to exclude underlying causes for LAE.  Further recommendations will be based on this " result.    6.  Preventive care.  Tetanus (Tdap) and recombinant zoster vaccinations provided at the time of his appointment.  I recommended annual influenza vaccination.  Colonoscopy was completed in 2019 and will be due in 2024.  He was congratulated for his regimen of regular exercise.  Based on his father's history, I recommended abdominal ultrasound to screen for AAA at age 67, despite his negative tobacco history.  We discussed the debate regarding the benefit of multivitamin supplements and the importance of selecting a product that does not contain iron should he choose to proceed.  Should he choose not to add a multivitamin supplement, he will increase his dose of vitamin D3 to 2000 international units daily.      PLAN: See above.     ~SRT    Answers for HPI/ROS submitted by the patient on 7/24/2020   SANJANA 7 TOTAL SCORE: 0  General Symptoms: No  Skin Symptoms: Yes  HENT Symptoms: No  EYE SYMPTOMS: No  HEART SYMPTOMS: No  LUNG SYMPTOMS: Yes  INTESTINAL SYMPTOMS: No  URINARY SYMPTOMS: No  REPRODUCTIVE SYMPTOMS: No  SKELETAL SYMPTOMS: No  BLOOD SYMPTOMS: No  NERVOUS SYSTEM SYMPTOMS: No  MENTAL HEALTH SYMPTOMS: No  Changes in hair: No  Changes in moles/birth marks: No  Itching: Yes  Rashes: No  Changes in nails: No  Acne: No  Change in facial hair: No  Warts: No  Non-healing sores: No  Scarring: No  Flaking of skin: No  Color changes of hands/feet in cold : No  Sun sensitivity: No  Skin thickening: No  Cough: No  Sputum or phlegm: No  Coughing up blood: No  Difficulty breating or shortness of breath: Yes  Snoring: No  Wheezing: No  Difficulty breathing on exertion: No  Nighttime Cough: No  Difficulty breathing when lying flat: No      Kyle Floyd MD

## 2020-07-30 NOTE — PROGRESS NOTES
" [facility] Outpatient Medical Nutrition Therapy      Time Spent:  60 minuts  Session Type:  Initial visit  Referral Source: [facility] Package/Dr. Kyle Floyd  Reason for RD Visit:   Nutritional counseling     Nutrition Assessment:  Patient is a 59 y.o. male with history of colon polyps, seasonal allergies.  Patient stated that he overall feels like he has a very healthy diet.  He stated that one issue is his cholesterol may be a little bit higher.  He does not have a lot of specific nutrition goals at visit today.  Although he would like to lose so interested in general nutrition recommendations as well as some to help with weight loss/management.  He stated that 2-3 nights per week he is away from home traveling for work and on those nights he may eat more past eat out more.  Otherwise use 3 meals a day about 2-3 snacks each day [employer] of frozen yogurt sometimes adds blueberries onto it other times may only have blueberries.  Admits drinking about 48 ounces of water.  He also complains of issues with leg cramps so will drink some tonic water. He also has some insomnia so he tries not to drink his Perlita antioxidant drinks in the evening or too close to bedtime since they have caffeine. See diet recall below.     Height:   Ht Readings from Last 1 Encounters:   07/29/19 1.88 m (6' 2\")     Weight:  195 lbs (88.5 kg)      BMI: 25.29    Diet Recall:  (some usual meals):   Meal Food    Breakfast Greek yogurt with granola, craisins and a homemade smoothie made with banana, spinach, almond milk, and sometimes adds lowfat frozen yogurt to it   Lunch 2x/week 6\" or 12\" meatball, cheese and veg sub on white bread OR ~2x/week has a chicken caesar salad or woods salad OR a couple of times per week a burger with side salad while traveling.   Dinner Shrimp, veggie, pasta stir andersen  OR 2x/week cod sauteed in olive oil/ baked salmon/pork chop sauteed in olive oil/ ribs with vegetables and half the time has white rice " or pasta with dinner other half no starch   Snacks Mid-morning: kind bar or  low sugar protein bar, while cooking eats ~60 cashews and after dinner has a very tiny scoop of frozen yogurt or blueberries or frozen yogurt with blueberries   Beverages ~48 oz water, occas sparkling water, while traveling might have 100% cranberry juice and sometimes has tart cherry juice or Perlita antioxidant drink. ~3x/week will have black tea in the morning. Drinks some tonic water at dinner unless traveling.         Frequency of eating/taking out meals: has to travel 2-3 days/night week so has to eat out at those times + about 2x/week for lunch as subway.     Labs:  On 2020: chol 209, , non . Others reviewed in EMR.   Pertinent Medications/vitamin and mineral supplements:  Vitamin D. Reviewed others in EMR.   Food Allergies:  NKFA  Physical Activity:  4 times per week goes running for 40 minutes. Occasionally uses weight when was going to his gym but not currently using gym.    Estimated Nutrition Needs based on Ideal body weight of 81 k-2430 calories (25-30 kcals/kg), 81-97g protein (1-1.2g/kg), ~1 ml/kcal for fluids or total fluids per MD.     Nutrition Diagnosis:    Food and nutrition related knowledge deficit related to lack of previous diet recall/complete recall of previous diet education as evidenced by pt report and interest in diet education.    Nutrition Prescription: Recommended general healthful diet     Nutrition Intervention:    Nutrition Education/Counseling:  Provided general healthful diet nutrition education with tips and suggestions. Reviewed food groups with some example foods in groups. Reviewed the difference between unsaturated and saturated fats with recommendation to aim for choosing unsaturated fat over saturated fats and reviewed some sources of both saturated and unsaturated fats. Encouraged patient to eat adequate fruit and vegetable servings per day (at least 5  servings but explained recommendation to aim for 9-11 servings per day). Discussed adequate hydration/recommendations and encouraged pt to increase water to consistently drink at least 64 oz (8 cups) per day. Told pt okay add lemon/lime to water or can flavor with cucumber slice or fresh herbs/fruit for example to naturally flavor water. Discussed some weight loss tips and encouraged pt to limit snacking especially after dinner unless truly hungry. Discussed some tips if pt wanted something sweet such as having a herbal dessert type tea like Tazo lemon loaf cake or just having some blueberries for dessert instead. encourged pt to limit grazing/snacking on cashews while cooking and instead recommended portioning out about 12-15 nuts and eating as a planned snack before he starts cooking or in the afternoon at work. Reviewed some of pt's usual packaged foods and beverages and gave tips and answered his questions re: added sugars and label reading. Answered patient's questions. Patient verbalized understanding of education provided. Provided pt with RD contact information and list of goals below.     Educational Materials Provided:  [employer] Daily Nutrition Plate method handout     Goals:  1. Continue eating 3 balanced meals per day.    2. Limit snacking to have a planned snack when going a long stretch between meals and/or if truly hungry.    -To help with some weight loss/maintaining weight, can start with limiting the number of nights you have frozen yogurt at night to help cut back on some calories.    -Can try having a caffeine free herbal tea such as Tazo Glazed lemon loaf tea (only has a few calories and can be in place of dessert).     3. Increase water to aim for drinking at least 6-8 cups (48 oz-64 oz) per day. Note: carbonated beverages/medrano do not provide oz for oz of still water because the bubbles are taking up some volume.     4. Can keep daily food and beverage journals using a cell phone flaquito such as  My Fitness pal or Lose it or other tracking flaquito or online option of your choice. This will help give you a good picture of your intake day to day and help make any adjustments as you try to lose/maintain weight.    Nutrition Monitoring and Evaluation: Will monitor adherence to nutrition recommendations at future RD visits.     Further Medical Nutrition Therapy:  Annual visit/PRN  Patient was encouraged to call/contact RD with any further questions.    Lauren Crane, MS, RD, LD

## 2020-07-30 NOTE — OUTPATIENT NURSE NOTE
"   07/30/20 1448   Fitness   Current Fitness Regimen:  4-5 d/wk combo of yot hoga (2-3), running (1), upper body lifting (1)   Fitness Goals Reduce body fat percentage, lose approx. 5 lbs, run 2-3 d/wk   Timeline   Recommended Activity this Week Continue current, but lift weights to \"failure minus 2-3\"   Recommended Minutes per Day this Week 50 Min   Recommended Number of Days this Week 4 Per Day/Per Week   Recommended Activity this Month As above, but attend fly feet 1 d/wk   Recommended Duration this Month 50 Min/Hrs   Recommended Frequency this Month 5 Per Day/Per Week   Recommended Activity the Nex 3 Months As above, but add lower body lifting exercise to upper body lifting day.   Recommended Duration the Nex 3 Months 50 Min/Hrs   Recommended Frequency the Nex 3 Months:  5 Per Day/Per Week   VO2 Max   VO2MAX:  37.1 ml/kg/min   VO2- max Percentile 50 %   Fitness Level Fair    Strength    Strength (Right):  92.1 ml/kg/min    Strength (Left) 84.4 ml/kg/min     "

## 2020-07-30 NOTE — NURSING NOTE
Chief Complaint   Patient presents with     Skin Check     Stefano is here for a skin check-one spot of concern on right arm.      Briana Khan LPN

## 2020-07-30 NOTE — LETTER
Date:August 4, 2020      Patient was self referred, no letter generated. Do not send.        [facility] Information

## 2020-07-30 NOTE — LETTER
7/30/2020     RE: Stefano Merino  [address]  [place] [address]     Dear Colleague,    Thank you for referring your patient, Stefano Merino, to the [facility] at [facility]. Please see a copy of my visit note below.     History and Physical Examination     SUBJECTIVE: Chief complaint: preventive health review.     Past Medical History:  1.  Seasonal (fall) allergic rhinitis  2.  History of adenomatous colonic polyps.  A 3.  Dyslipidemia.  4.  Status post excision of benign tumor, right pretibial region, circa 1990  5.  Status post bilateral LASIK procedures.  6.  Erectile dysfunction.     Adverse Drug Reactions: None.     Current Medications:  Albuterol, 2 puffs 4 times a day as needed  Vitamin D3, 1000 international units daily  Diphenhydramine, 25 mg every 6 hours initiated.  Montelukast, 10 mg daily at bedtime, used as needed.  Tadalafil, 20 mg daily as needed, used approximately twice per week.  Fluticasone-vilantrol, 100-25, 1 puff daily as needed     Habits:  Tobacco: Never  Alcohol: 1-2 servings per week  Caffeine: 3 servings of tea per week     Social History:  (2011) father of 2 children from his 20-year marriage, currently in a mutually monogamous relationship with Dayana, a female partner.  Daughter Kristina, 23, a [place] graduate who was a competitive swimmer in high school.  Now works in [employer] for [employer]; daughter, Kaley, age 19, who enjoys [employer], will be a sophomore at [employer] in [place].  Stefano is a native of [place] who attended [employer], where he studied economics, before attending graduate school at [employer].  He moved to the [facility] 2 years ago after spending time in [place] and [place].  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  Away from work, he enjoys golf and other exercise.  Typically he exercises 5 times week, typically running 2-3 times and engaging in yoga or strength training 2-3 times.     Family History: Mother is 97, a retired nurse anesthetist with history of breast cancer, hypertension, vertigo, and dementia.  Father  at age 72 from AAA rupture, with no history of tobacco use or known medical problems.  A sister  at approximately age 50 from AIDS.  A 61-year-old sister is a breast cancer survivor.  Daughters are in excellent health.  Paternal grandparents  in their 80s.  Maternal grandparents medical history not known.    Review of Systems:  Intermittent nocturnal lower extremity cramps, typically affecting calves and feet; possibly somewhat better with consumption of tonic water.  No lower extremity cramping noted with exertion.  Mild initial insomnia, managed with diphenhydramine, which also helps treat allergic rhinitis symptoms.  No known snoring, gasping, or daytime somnolence.  Recent mild transient heartburn symptoms when tadalafil used shortly after a meal.  Periodic episodes of persistent coughing and urged to \"sigh\", for which she was prescribed montelukast and inhalers for presumed mild asthma; no history of bronchoprovocation testing.  No symptoms of GERD at other times and specifically no symptoms with exertion.  Most recent colonoscopy negative in 2019.  Most recent tetanus booster administered more than 10 years ago.  No history of zoster vaccination.  Remainder of complete review of systems was negative.     OBJECTIVE:     Vital signs: Height 73.5 inches.  Weight 195 pounds.  Blood pressure 118/83 on average of 3 automated readings.  Heart rate 65.  Respiratory rate 18.  O2 saturation 98% on room air.  General: Alert, neatly dressed and groomed, in no acute distress.  HEENT: Atraumatic and normocephalic. Eyelids, pupils, and conjunctivae appeared normal. Lips, teeth and gums appear " "normal.  Oropharynx showed moist mucous membranes, without exudate or erythema; slightly \"crowded\" airway.  Neck: Supple, without thyromegaly, mass, or bruit. No cervical or supraclavicular lymphadenopathy.  Back: No spinal or costovertebral angle tenderness.  Chest: Clear to auscultation and percussion. Normal respiratory effort.  Cardiovascular: No jugular venous distention. Regular rate and rhythm, normal S1, S2 without murmur.  Abdomen: Bowel sounds positive; soft, nontender, without rebound, guarding, hepatosplenomegaly or mass.  Extremities: No cyanosis or edema.  Genitalia: Normal male genitalia, without scrotal mass or hernia. No inguinal lymphadenopathy.  Rectal: Normal tone, with smooth, nontender, minimally enlarged prostate. No rectal mass.  Skin: Examination was deferred; full evaluation was completed earlier in day through dermatology clinic.  Neurologic: Cranial nerves II-XII were grossly intact. Sensory and motor examinations were normal. Normal gait.  Mini-cog score was 4/5 (2/3 words recalled at 5 minutes).  Psychiatric: Alert and oriented ×3. Normal affect. Judgment and insight intact.  SANJANA-7 score was 0.  PH she will-2 score was 0.    Creatinine 0.98, alkaline phosphatase 54, ALT 34, cholesterol 209, HDL 57, , triglycerides 73, PSA 0.94, TSH 1.28, 25-hydroxy vitamin D 39, glucose 95, white blood cell count 7100, hemoglobin 15.4, platelets 235,000, hepatitis C and HIV antibodies nonreactive.  Urinalysis unremarkable.    EKG showed sinus bradycardia with possible left atrial enlargement.  Spirometry showed an FEV1 of 4.05, with an FVC of 4.74; readings were 104% and 95% of predicted values, respectively.    DEXA showed normal bone density, with most negative and valid T-score of -0.7 at the level of the right femoral neck.  Body composition analysis showed 23.1% fat (40th percentile); body mass index was 25.29.     ASSESSMENT:    1.  Idiopathic nocturnal cramps.  No exertional symptoms.  " "Normal pedal pulses.  He has noted slight improvement with consumption of tonic water.  He was advised that use of quinine water is acceptable but that quinine medication treatment is no longer recommended.  He will add stretching exercises at bedtime and pursue further evaluation in the event of persistent symptoms or if exertional symptoms are noted.    2.  Insomnia.  Mild initial insomnia, managed with diphenhydramine.  I recommended that he closely follow usual \"sleep hygiene\" recommendations, which were reviewed at length.  If these measures alone do not lead to resolution of his insomnia, he will consider use of melatonin, 0.3-0.5 mg daily at bedtime; we discussed the fact that available OTC preparations are much higher doses.  We agreed that use of diphenhydramine would be reasonable during his allergy season given dual benefit.  He denies signs or symptoms of sleep apnea, but agrees to pursue further evaluation if his partner notes loud snoring, gasping, or pauses in breathing during sleep.    3.  Dyslipidemia.  Using current readings and guidelines from the AHA/ACC, his estimated 10-year risk of a vascular event is 6.1% (optimal for his cohort is 5.3%).  Since he believes there is room for improvement in his diet, he will work on modifying his diet and repeat fasting cholesterol fractionation in approximately 6 months.  We discussed usual guidelines regarding the threshold for recommendation of statin treatment; since there are non-pharmacologic measures to pursue, he elected to postpone treatment at this time.  We discussed the elements of a healthful diet and he was encouraged to maintain ideal weight, while continuing his regimen of regular exercise.    4.  Possible asthma.  Since diagnosis has been presumptive, I recommended bronchoprovocation studies to confirm the diagnosis.  If it is demonstrated that he has asthma, I would recommend pneumococcal (PPSV 23) vaccination.    5.  Possible LAE.  No " evidence of left ventricular hypertrophy on EKG.  Acceptable blood pressure.  I recommended an echocardiogram to exclude underlying causes for LAE.  Further recommendations will be based on this result.    6.  Preventive care.  Tetanus (Tdap) and recombinant zoster vaccinations provided at the time of his appointment.  I recommended annual influenza vaccination.  Colonoscopy was completed in 2019 and will be due in 2024.  He was congratulated for his regimen of regular exercise.  Based on his father's history, I recommended abdominal ultrasound to screen for AAA at age 67, despite his negative tobacco history.  We discussed the debate regarding the benefit of multivitamin supplements and the importance of selecting a product that does not contain iron should he choose to proceed.  Should he choose not to add a multivitamin supplement, he will increase his dose of vitamin D3 to 2000 international units daily.      PLAN: See above.     ~SRT    Answers for HPI/ROS submitted by the patient on 7/24/2020   SANJANA 7 TOTAL SCORE: 0  General Symptoms: No  Skin Symptoms: Yes  HENT Symptoms: No  EYE SYMPTOMS: No  HEART SYMPTOMS: No  LUNG SYMPTOMS: Yes  INTESTINAL SYMPTOMS: No  URINARY SYMPTOMS: No  REPRODUCTIVE SYMPTOMS: No  SKELETAL SYMPTOMS: No  BLOOD SYMPTOMS: No  NERVOUS SYSTEM SYMPTOMS: No  MENTAL HEALTH SYMPTOMS: No  Changes in hair: No  Changes in moles/birth marks: No  Itching: Yes  Rashes: No  Changes in nails: No  Acne: No  Change in facial hair: No  Warts: No  Non-healing sores: No  Scarring: No  Flaking of skin: No  Color changes of hands/feet in cold : No  Sun sensitivity: No  Skin thickening: No  Cough: No  Sputum or phlegm: No  Coughing up blood: No  Difficulty breating or shortness of breath: Yes  Snoring: No  Wheezing: No  Difficulty breathing on exertion: No  Nighttime Cough: No  Difficulty breathing when lying flat: No    Again, thank you for allowing me to participate in the care of your patient.       Sincerely,    Kyle Floyd MD

## 2020-07-30 NOTE — NURSING NOTE
AHA BP    1st   115/80  2nd  117/87  3rd   120/82    Average  118/83  Digna Trevizo [facility] 7:26 AM on 7/30/2020.

## 2020-07-30 NOTE — PROGRESS NOTES
History and Physical Examination     SUBJECTIVE: Chief complaint: preventive health review.     Past Medical History:  1.  Seasonal (fall) allergic rhinitis  2.  History of adenomatous colonic polyps.    3.  Dyslipidemia.  4.  Status post excision of benign tumor, right pretibial region, circa 1990  5.  Status post bilateral LASIK procedures.  6.  Erectile dysfunction.     Adverse Drug Reactions: None.     Current Medications:  Albuterol, 2 puffs 4 times a day as needed  Vitamin D3, 1000 international units daily  Diphenhydramine, 25 mg every 6 hours initiated.  Montelukast, 10 mg daily at bedtime, used as needed.  Tadalafil, 20 mg daily as needed, used approximately twice per week.  Fluticasone-vilantrol, 100-25, 1 puff daily as needed     Habits:  Tobacco: Never  Alcohol: 1-2 servings per week  Caffeine: 3 servings of tea per week     Social History:  (2011) father of 2 children from his 20-year marriage, currently in a mutually monogamous relationship with Dayana, a female partner.  Daughter Kristina, 23, a [employer] graduate who was a competitive swimmer in high school who now works in [employer] for [employer]; daughter, Kaley, age 19, who enjoys [employer], will be a sophomore at [employer] in [place].  Stefano is a native of the [place] area who attended [employer], where he studied economics, before attending graduate school at [employer].  He moved to the [facility] [place] [facility] 2 years ago after spending time in [place] and [place].  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  Away from work, he enjoys golf and other exercise.  Typically he exercises 5 times week, typically running 2-3 times and engaging in yoga or strength training 2-3 times.     Family History: Mother is 97, a retired nurse anesthetist with history of breast cancer,  "hypertension, vertigo, and dementia.  Father  at age 72 from AAA rupture, with no history of tobacco use or known medical problems.  A sister  at approximately age 50 from AIDS.  A 61-year-old sister is a breast cancer survivor.  Daughters are in excellent health.  Paternal grandparents  in their 80s.  Maternal grandparents medical history not known.    Review of Systems:  Intermittent nocturnal lower extremity cramps, typically affecting calves and feet; possibly somewhat better with consumption of tonic water.  No lower extremity cramping noted with exertion.  Mild initial insomnia, managed with diphenhydramine, which also helps treat allergic rhinitis symptoms.  No known snoring, gasping, or daytime somnolence.  Recent mild transient heartburn symptoms when tadalafil was used shortly after a meal.  Periodic episodes of persistent coughing and urge to \"sigh\", for which he was prescribed montelukast and inhalers for presumed mild asthma; no history of bronchoprovocation testing.  No symptoms of GERD at other times and specifically no symptoms with exertion.  Most recent colonoscopy negative in 2019.  Most recent tetanus booster administered more than 10 years ago.  No history of zoster vaccination.  Remainder of complete review of systems was negative.     OBJECTIVE:     Vital signs: Height 73.5 inches.  Weight 195 pounds.  Blood pressure 118/83 on average of 3 automated readings.  Heart rate 65.  Respiratory rate 18.  O2 saturation 98% on room air.  General: Alert, neatly dressed and groomed, in no acute distress.  HEENT: Atraumatic and normocephalic. Eyelids, pupils, and conjunctivae appeared normal. Lips, teeth and gums appear normal.  Oropharynx showed moist mucous membranes, without exudate or erythema; slightly \"crowded\" airway.  Neck: Supple, without thyromegaly, mass, or bruit. No cervical or supraclavicular lymphadenopathy.  Back: No spinal or costovertebral angle tenderness.  Chest: Clear to " auscultation and percussion. Normal respiratory effort.  Cardiovascular: No jugular venous distention. Regular rate and rhythm, normal S1, S2 without murmur.  Abdomen: Bowel sounds positive; soft, nontender, without rebound, guarding, hepatosplenomegaly or mass.  Extremities: No cyanosis or edema.  Genitalia: Normal male genitalia, without scrotal mass or hernia. No inguinal lymphadenopathy.  Rectal: Normal tone, with smooth, nontender, minimally enlarged prostate. No rectal mass.  Skin: Examination was deferred; full evaluation was completed earlier in day through dermatology clinic.  Neurologic: Cranial nerves II-XII were grossly intact. Sensory and motor examinations were normal. Normal gait.  Mini-cog score was 4/5 (2/3 words recalled at 5 minutes).  Psychiatric: Alert and oriented ×3. Normal affect. Judgment and insight intact.  SANJANA-7 score was 0.  PH she will-2 score was 0.    Creatinine 0.98, alkaline phosphatase 54, ALT 34, cholesterol 209, HDL 57, , triglycerides 73, PSA 0.94, TSH 1.28, 25-hydroxy vitamin D 39, glucose 95, white blood cell count 7100, hemoglobin 15.4, platelets 235,000, hepatitis C and HIV antibodies nonreactive.  Urinalysis unremarkable.    EKG showed sinus bradycardia with possible left atrial enlargement.  Spirometry showed an FEV1 of 4.05, with an FVC of 4.74; readings were 104% and 95% of predicted values, respectively.    DEXA showed normal bone density, with most negative and valid T-score of -0.7 at the level of the right femoral neck.  Body composition analysis showed 23.1% fat (40th percentile); body mass index was 25.29.     ASSESSMENT:    1.  Idiopathic nocturnal cramps.  No exertional symptoms.  Normal pedal pulses.  He has noted slight improvement with consumption of tonic water.  He was advised that use of quinine water is acceptable but that quinine medication treatment is no longer recommended.  He will add stretching exercises at bedtime and pursue further  "evaluation in the event of persistent symptoms or if exertional symptoms are noted.    2.  Insomnia.  Mild initial insomnia, managed with diphenhydramine.  I recommended that he closely follow usual \"sleep hygiene\" recommendations, which were reviewed at length.  If these measures alone do not lead to resolution of his insomnia, he will consider use of melatonin, 0.3-0.5 mg daily at bedtime; we discussed the fact that available OTC preparations are much higher doses.  We agreed that use of diphenhydramine would be reasonable during his allergy season given dual benefit.  He denies signs or symptoms of sleep apnea, but agrees to pursue further evaluation if his partner notes loud snoring, gasping, or pauses in breathing during sleep.    3.  Dyslipidemia.  Using current readings and guidelines from the AHA/ACC, his estimated 10-year risk of a vascular event is 6.1% (optimal for his cohort is 5.3%).  Since he believes there is room for improvement in his diet, he will work on modifying his diet and repeat fasting cholesterol fractionation in approximately 6 months.  We discussed usual guidelines regarding the threshold for recommendation of statin treatment; since there are non-pharmacologic measures to pursue, he elected to postpone treatment at this time.  We discussed the elements of a healthful diet and he was encouraged to maintain ideal weight, while continuing his regimen of regular exercise.    4.  Possible asthma.  Since diagnosis has been presumptive, I recommended bronchoprovocation studies to confirm the diagnosis.  If it is demonstrated that he has asthma, I would recommend pneumococcal (PPSV 23) vaccination.    5.  Possible LAE.  No evidence of left ventricular hypertrophy on EKG.  Acceptable blood pressure.  I recommended an echocardiogram to exclude underlying causes for LAE.  Further recommendations will be based on this result.    6.  Preventive care.  Tetanus (Tdap) and recombinant zoster " vaccinations provided at the time of his appointment.  I recommended annual influenza vaccination.  Colonoscopy was completed in 2019 and will be due in 2024.  He was congratulated for his regimen of regular exercise.  Based on his father's history, I recommended abdominal ultrasound to screen for AAA at age 67, despite his negative tobacco history.  We discussed the debate regarding the benefit of multivitamin supplements and the importance of selecting a product that does not contain iron should he choose to proceed.  Should he choose not to add a multivitamin supplement, he will increase his dose of vitamin D3 to 2000 international units daily.      PLAN: See above.     ~SRT    Answers for HPI/ROS submitted by the patient on 7/24/2020   SANJANA 7 TOTAL SCORE: 0  General Symptoms: No  Skin Symptoms: Yes  HENT Symptoms: No  EYE SYMPTOMS: No  HEART SYMPTOMS: No  LUNG SYMPTOMS: Yes  INTESTINAL SYMPTOMS: No  URINARY SYMPTOMS: No  REPRODUCTIVE SYMPTOMS: No  SKELETAL SYMPTOMS: No  BLOOD SYMPTOMS: No  NERVOUS SYSTEM SYMPTOMS: No  MENTAL HEALTH SYMPTOMS: No  Changes in hair: No  Changes in moles/birth marks: No  Itching: Yes  Rashes: No  Changes in nails: No  Acne: No  Change in facial hair: No  Warts: No  Non-healing sores: No  Scarring: No  Flaking of skin: No  Color changes of hands/feet in cold : No  Sun sensitivity: No  Skin thickening: No  Cough: No  Sputum or phlegm: No  Coughing up blood: No  Difficulty breating or shortness of breath: Yes  Snoring: No  Wheezing: No  Difficulty breathing on exertion: No  Nighttime Cough: No  Difficulty breathing when lying flat: No

## 2020-07-30 NOTE — PROGRESS NOTES
History  HPI     Annual Eye Exam     In both eyes.  This started years ago.  Occurring constantly.  It is worse throughout the day.  Since onset it is stable.  Treatments tried include no treatments.  Pain was noted as 0/10.              Comments     Uses glasses for driving at night occasionally. And OTC readers in low lighting occasionally as well. Patient states vision has been stable since last eye exam, both eyes. Denies eye pain or irritation. Does not take eye drops.    Gloria Ruiz COT 7:34 AM July 30, 2020     Had LASIK 20 years ago. Reports being very nearsighted.              Last edited by Suhail Welch, OD on 7/30/2020  8:02 AM. (History)          Assessment/Plan  (H52.13) Myopia of both eyes  (primary encounter diagnosis)  Comment: Myopia both eyes with early presbyopia  Plan: REFRACTION         Educated patient on condition and clinical findings. Dispensed spectacle prescription for as-needed wear. Monitor annually.    (Z98.890) Hx of LASIK  Comment: 20+ years ago, stable  Plan:  No treatment indicated at this time. Monitor annually.    (H25.093) Age-related incipient cataract of both eyes  Comment: Not visually significant  Plan:  No treatment indicated at this time. Monitor annually.    Return to clinic in 1 year for comprehensive eye exam.    Complete documentation of historical and exam elements from today's encounter can  be found in the full encounter summary report (not reduplicated in this progress  note). I personally obtained the chief complaint(s) and history of present illness. I  confirmed and edited as necessary the review of systems, past medical/surgical  history, family history, social history, and examination findings as documented by  others; and I examined the patient myself. I personally reviewed the relevant tests,  images, and reports as documented above. I formulated and edited as necessary the  assessment and plan and discussed the findings and management plan with  the  patient and family.    Suhail Welch, MICHELE, FAAO

## 2020-07-31 LAB
EXPTIME-PRE: 6.6 SEC
FEF2575-%PRED-PRE: 139 %
FEF2575-PRE: 4.57 L/SEC
FEF2575-PRED: 3.28 L/SEC
FEFMAX-%PRED-PRE: 106 %
FEFMAX-PRE: 10.97 L/SEC
FEFMAX-PRED: 10.3 L/SEC
FEV1-%PRED-PRE: 104 %
FEV1-PRE: 4.05 L
FEV1FEV6-PRE: 86 %
FEV1FEV6-PRED: 79 %
FEV1FVC-PRE: 85 %
FEV1FVC-PRED: 78 %
FIFMAX-PRE: 5.84 L/SEC
FVC-%PRED-PRE: 95 %
FVC-PRE: 4.74 L
FVC-PRED: 4.97 L

## 2020-08-02 LAB
COVID-19 SPIKE RBD ABY TITER: NORMAL
COVID-19 SPIKE RBD ABY: NEGATIVE

## 2020-09-01 ENCOUNTER — VIRTUAL VISIT (OUTPATIENT)
Dept: FAMILY MEDICINE | Facility: OTHER | Age: 60
End: 2020-09-01
Payer: COMMERCIAL

## 2020-09-01 DIAGNOSIS — Z20.822 SUSPECTED COVID-19 VIRUS INFECTION: Primary | ICD-10-CM

## 2020-09-01 PROCEDURE — 99421 OL DIG E/M SVC 5-10 MIN: CPT | Performed by: EMERGENCY MEDICINE

## 2020-09-02 DIAGNOSIS — Z20.822 SUSPECTED COVID-19 VIRUS INFECTION: ICD-10-CM

## 2020-09-02 PROCEDURE — U0003 INFECTIOUS AGENT DETECTION BY NUCLEIC ACID (DNA OR RNA); SEVERE ACUTE RESPIRATORY SYNDROME CORONAVIRUS 2 (SARS-COV-2) (CORONAVIRUS DISEASE [COVID-19]), AMPLIFIED PROBE TECHNIQUE, MAKING USE OF HIGH THROUGHPUT TECHNOLOGIES AS DESCRIBED BY CMS-2020-01-R: HCPCS | Performed by: FAMILY MEDICINE

## 2020-09-02 NOTE — PROGRESS NOTES
"Date: 2020 14:29:08  Clinician: John Pena  Clinician NPI: [id number]  Patient: belén callahan  Patient : 1960  Patient Address: [address] [place] [address]  Patient Phone: [phone]  Visit Protocol: URI  Patient Summary:  belén is a 59 year old ( : 1960 ) male who initiated a Visit for COVID-19 (Coronavirus) evaluation and screening. When asked the question \"Please sign me up to receive news, health information and promotions from [employer].\", belén responded \"No\".    When asked when his symptoms started, belén reported that he does not have any symptoms.   He denies having recent facial or sinus surgery in the past 60 days and taking antibiotic medication in the past month.    Pertinent COVID-19 (Coronavirus) information  In the past 14 days, belén has not worked in a congregate living setting.   He does not work or volunteer as healthcare worker or a  and does not work or volunteer in a healthcare facility.   belén also has not lived in a congregate living setting in the past 14 days. He does not live with a healthcare worker.   belén has had a close contact with a laboratory-confirmed COVID-19 patient in the last 14 days. He was exposed at his work. Additional information about contact with COVID-19 (Coronavirus) patient as reported by the patient (free text): I was exposed to them from 2 weeks ago to 1 week ago    Patient reported they are not living in the same household with a COVID-19 positive patient.  Patient denies being in an enclosed space for greater than 15 minutes with a COVID-19 patient.  Since 2019, belén and has had upper respiratory infection (URI) or influenza-like illness. Has not been diagnosed with lab-confirmed COVID-19 test      Date(s) of previous URI or influenza-like illness (free-text): Pneumonia many years ago.   A persistent cough for almost a month ago     Symptoms belén experienced during previous URI or " influenza-like illness as reported by the patient (free-text): Nagging cough throughout the day        Pertinent medical history  belén does not need a return to work/school note.   Weight: 191 lbs   belén does not smoke or use smokeless tobacco.   Additional information as reported by the patient (free text): I have also had hay fever for all of my life   Weight: 191 lbs    MEDICATIONS: Benadryl Allergy oral, ALLERGIES: NKDA  Clinician Response:  Dear belén,   Your symptoms show that you may have coronavirus (COVID-19). This illness can cause fever, cough and trouble breathing. Many people get a mild case and get better on their own. Some people can get very sick.  What should I do?  We would like to test you for this virus.   1. Please call [phone] to schedule your visit. Explain that you were referred by [facility] to have a COVID-19 test. Be ready to share your [facility] visit ID number.  The following will serve as your written order for this COVID Test, ordered by me, for the indication of suspected COVID [Z20.828]: The test will be ordered in [employer], our electronic health record, after you are scheduled. It will show as ordered and authorized by Bhavesh Meeks MD.  Order: COVID-19 (Coronavirus) PCR for SYMPTOMATIC testing from [facility].      2. When it's time for your COVID test:  Stay at least 6 feet away from others. (If someone will drive you to your test, stay in the backseat, as far away from the  as you can.)   Cover your mouth and nose with a mask, tissue or washcloth.  Go straight to the testing site. Don't make any stops on the way there or back.      3.Starting now: Stay home and away from others (self-isolate) until:   You've had no fever---and no medicine that reduces fever---for one full day (24 hours). And...   Your other symptoms have gotten better. For example, your cough or breathing has improved. And...   At least 10 days have passed since your symptoms started.       During this time, don't  "leave the house except for testing or medical care.   Stay in your own room, even for meals. Use your own bathroom if you can.   Stay away from others in your home. No hugging, kissing or shaking hands. No visitors.  Don't go to work, school or anywhere else.    Clean \"high touch\" surfaces often (doorknobs, counters, handles, etc.). Use a household cleaning spray or wipes. You'll find a full list of  on the EPA website: www.epa.gov/pesticide-registration/list-n-disinfectants-use-against-sars-cov-2.   Cover your mouth and nose with a mask, tissue or washcloth to avoid spreading germs.  Wash your hands and face often. Use soap and water.  Caregivers in these groups are at risk for severe illness due to COVID-19:  o People 65 years and older  o People who live in a nursing home or long-term care facility  o People with chronic disease (lung, heart, cancer, diabetes, kidney, liver, immunologic)  o People who have a weakened immune system, including those who:   Are in cancer treatment  Take medicine that weakens the immune system, such as corticosteroids  Had a bone marrow or organ transplant  Have an immune deficiency  Have poorly controlled HIV or AIDS  Are obese (body mass index of 40 or higher)  Smoke regularly   o Caregivers should wear gloves while washing dishes, handling laundry and cleaning bedrooms and bathrooms.  o Use caution when washing and drying laundry: Don't shake dirty laundry, and use the warmest water setting that you can.  o For more tips, go to www.cdc.gov/coronavirus/2019-ncov/downloads/10Things.pdf.    4.Sign up for [employer]. We know it's scary to hear that you might have COVID-19. We want to track your symptoms to make sure you're okay over the next 2 weeks. Please look for an email from [employer]---this is a free, online program that we'll use to keep in touch. To sign up, follow the link in the email. Learn more at [email or web address]  How can I take care of " myself?   Get lots of rest. Drink extra fluids (unless a doctor has told you not to).   Take Tylenol (acetaminophen) for fever or pain. If you have liver or kidney problems, ask your family doctor if it's okay to take Tylenol.   Adults can take either:    650 mg (two 325 mg pills) every 4 to 6 hours, or...   1,000 mg (two 500 mg pills) every 8 hours as needed.    Note: Don't take more than 3,000 mg in one day. Acetaminophen is found in many medicines (both prescribed and over-the-counter medicines). Read all labels to be sure you don't take too much.   For children, check the Tylenol bottle for the right dose. The dose is based on the child's age or weight.    If you have other health problems (like cancer, heart failure, an organ transplant or severe kidney disease): Call your specialty clinic if you don't feel better in the next 2 days.       Know when to call 911. Emergency warning signs include:    Trouble breathing or shortness of breath Pain or pressure in the chest that doesn't go away Feeling confused like you haven't felt before, or not being able to wake up Bluish-colored lips or face.  Where can I get more information?   [facility] -- About COVID-19: [email or web address]   CDC -- What to Do If You're Sick: www.cdc.gov/coronavirus/2019-ncov/about/steps-when-sick.html   CDC -- Ending Home Isolation: www.cdc.gov/coronavirus/2019-ncov/hcp/disposition-in-home-patients.html   CDC -- Caring for Someone: www.cdc.gov/coronavirus/2019-ncov/if-you-are-sick/care-for-someone.html   [facility] -- Interim Guidance for Hospital Discharge to Home: www.health.[facility].mn.us/diseases/coronavirus/hcp/hospdischarge.pdf   [facility] clinical trials (COVID-19 research studies): clinicalaffairs.[facility]/umn-clinical-trials    Below are the COVID-19 hotlines at the [place] Department of Health ([facility]). Interpreters are available.    For health questions: Call 175-137-4164 or 1-763.706.1156 (7 a.m. to 7 p.m.)  For questions about schools and childcare: Call 229-623-4798 or 1-747.287.9137 (7 a.m. to 7 p.m.)    Diagnosis: Cough  Diagnosis ICD: R05

## 2020-09-03 LAB
SARS-COV-2 RNA SPEC QL NAA+PROBE: NOT DETECTED
SPECIMEN SOURCE: NORMAL

## 2020-09-18 ENCOUNTER — OFFICE VISIT (OUTPATIENT)
Dept: DERMATOLOGY | Facility: CLINIC | Age: 60
End: 2020-09-18
Payer: COMMERCIAL

## 2020-09-18 ENCOUNTER — ANCILLARY PROCEDURE (OUTPATIENT)
Dept: CARDIOLOGY | Facility: CLINIC | Age: 60
End: 2020-09-18
Attending: INTERNAL MEDICINE
Payer: COMMERCIAL

## 2020-09-18 DIAGNOSIS — I51.7 LAE (LEFT ATRIAL ENLARGEMENT): ICD-10-CM

## 2020-09-18 DIAGNOSIS — Z00.00 PREVENTATIVE HEALTH CARE: ICD-10-CM

## 2020-09-18 DIAGNOSIS — R21 RASH: Primary | ICD-10-CM

## 2020-09-18 DIAGNOSIS — J30.2 SEASONAL ALLERGIC RHINITIS, UNSPECIFIED TRIGGER: ICD-10-CM

## 2020-09-18 RX ORDER — LIDOCAINE HYDROCHLORIDE AND EPINEPHRINE 10; 10 MG/ML; UG/ML
3 INJECTION, SOLUTION INFILTRATION; PERINEURAL ONCE
Status: DISCONTINUED | OUTPATIENT
Start: 2020-09-18 | End: 2022-03-21

## 2020-09-18 ASSESSMENT — PAIN SCALES - GENERAL: PAINLEVEL: NO PAIN (0)

## 2020-09-18 NOTE — PATIENT INSTRUCTIONS

## 2020-09-18 NOTE — PROGRESS NOTES
[facility] Dermatology Note      Dermatology Problem List:  1.Rash - R volar forearm - s/p bx 9/18/20- ddx: GA vs tinea vs PSO vs dermatitis vs majocchi granuloma  -triamcinolone, benadryl cream not helpful    CC:   Chief Complaint   Patient presents with     Rash     Stefano is here today for a rash that is still itching      Encounter Date: Sep 18, 2020    History of Present Illness:  Mr. Stefano Merino is a 59 year old male who returns to the dermatology clinic regarding a rash on the forearm. Initially he thought it was a bug bite. He has tried benadryl cream on it without benefit. When he was here last it appeared to be consistent with dermatitis, and he was given triamcinolone cream but he notes it continues to be itchy. The site of the rash got smaller, but it continues to be persistent. It as been present since mid July. He is otherwise doing well and has no other areas of concern.     Past Medical History:   Patient Active Problem List   Diagnosis     Erectile dysfunction of organic origin     Allergic rhinitis     Dyslipidemia     Past Medical History:   Diagnosis Date     Colon polyps      Seasonal allergies      Past Surgical History:   Procedure Laterality Date     CHOLECYSTECTOMY      hx polyps     SOFT TISSUE SURGERY      benign lump removed from leg       Social History:  Works. Has children. Enjoys golfing.     Family History:  There is no family history of skin cancer. There is no family history of melanoma.    Medications:  Current Outpatient Medications   Medication Sig Dispense Refill     albuterol (VENTOLIN HFA) 108 (90 Base) MCG/ACT inhaler        Cholecalciferol (VITAMIN D3) 25 MCG (1000 UT) CAPS Take 1,000 Units by mouth       diphenhydrAMINE (BENADRYL) 25 MG capsule Take 25 mg by mouth every 6 hours as needed for itching or allergies       fluticasone-vilanterol (BREO ELLIPTA) 100-25 MCG/INH inhaler Inhale 1 puff into the lungs       montelukast (SINGULAIR) 10 MG tablet Take  1 tablet (10 mg) by mouth At Bedtime 30 tablet 2     tadalafil (CIALIS) 20 MG tablet Take 1 tablet (20 mg) by mouth daily as needed (E.D.)       triamcinolone (KENALOG) 0.025 % external ointment Apply topically 2 times daily 80 g 0     No Known Allergies      Review of Systems:  -Heme/Lymph: no concerning bumps, no bleeding or bruising problems   -Constitutional: The patient denies fatigue, fevers, chills, unintended weight loss, and night sweats.  -HEENT: Patient denies nonhealing oral sores.  -Skin: As above in HPI. No additional skin concerns.    Physical exam:  Vitals: There were no vitals taken for this visit.  GEN: This is a well developed, well-nourished male in no acute distress, in a pleasant mood.    SKIN: Focused examination of the face and bilateral UEs was performed.  -R volar forearm with 1.5cm hyperpigmented, mildly scaly and raised plaque  -No other lesions of concern on areas examined.     Impression/Plan:  1. Rash - ddx: GA vs tinea vs dermatitis vs PSO vs Majocchi granuloma  -Punch biopsy:  After discussion of benefits and risks including but not limited to bleeding/bruising, pain/swelling, infection, scar, incomplete removal, nerve damage/numbness, recurrence, and non-diagnostic biopsy, written consent, verbal consent and photographs were obtained. Time-out was performed. The area was cleaned with isopropyl alcohol. 0.5mL of 1% lidocaine with 1:100,000 epinephrine was injected to obtain adequate anesthesia of the lesion on the R volar forearm.  A 4 mm punch biopsy was performed. 4-0 prolene sutures were utilized to approximate the epidermal edges. White petroleum jelly/Vaseline and a bandage was applied to the wound. Explicit verbal and written wound care instructions were provided. The patient left the Dermatology Clinic in good condition. The patient was counseled to follow up for suture removal in approximately 10-14 days.  -Photograph was obtained for clinical monitoring and inclusion in  medical record.  -Hold triamcinolone on the site for now as this can delay healing, will treat pending path result    CC Dr. Shirley on close of this encounter.  Follow-up prn for new or changing lesions.       Staff Involved:  Staff Only  All risks, benefits and alternatives were discussed with patient.  Patient is in agreement and understands the assessment and plan.  All questions were answered.    Dana Martin PA-C, MPAS  [facility] [place]: Phone: [phone], Fax: [phone]  [facility]: Phone: [phone],  Fax: [phone]

## 2020-09-18 NOTE — LETTER
9/18/2020       RE: Stefano Merino  [address]  [place] [address]     Dear Colleague,    Thank you for referring your patient, Stefano Merino, to the [facility] DERMATOLOGY at [facility]. Please see a copy of my visit note below.    [facility] Dermatology Note      Dermatology Problem List:  1.Rash - R volar forearm - s/p bx 9/18/20- ddx: GA vs tinea vs PSO vs dermatitis vs majocchi granuloma  -triamcinolone, benadryl cream not helpful    CC:   Chief Complaint   Patient presents with     Rash     Stefano is here today for a rash that is still itching      Encounter Date: Sep 18, 2020    History of Present Illness:  Mr. Stefano Merino is a 59 year old male who returns to the dermatology clinic regarding a rash on the forearm. Initially he thought it was a bug bite. He has tried benadryl cream on it without benefit. When he was here last it appeared to be consistent with dermatitis, and he was given triamcinolone cream but he notes it continues to be itchy. The site of the rash got smaller, but it continues to be persistent. It as been present since mid July. He is otherwise doing well and has no other areas of concern.     Past Medical History:   Patient Active Problem List   Diagnosis     Erectile dysfunction of organic origin     Allergic rhinitis     Dyslipidemia     Past Medical History:   Diagnosis Date     Colon polyps      Seasonal allergies      Past Surgical History:   Procedure Laterality Date     CHOLECYSTECTOMY      hx polyps     SOFT TISSUE SURGERY      benign lump removed from leg       Social History:  Works. Has children. Enjoys golfing.     Family History:  There is no family history of skin cancer. There is no family history of melanoma.    Medications:  Current Outpatient Medications   Medication Sig Dispense Refill     albuterol (VENTOLIN HFA) 108 (90 Base) MCG/ACT inhaler        Cholecalciferol (VITAMIN D3) 25 MCG (1000 UT) CAPS  Take 1,000 Units by mouth       diphenhydrAMINE (BENADRYL) 25 MG capsule Take 25 mg by mouth every 6 hours as needed for itching or allergies       fluticasone-vilanterol (BREO ELLIPTA) 100-25 MCG/INH inhaler Inhale 1 puff into the lungs       montelukast (SINGULAIR) 10 MG tablet Take 1 tablet (10 mg) by mouth At Bedtime 30 tablet 2     tadalafil (CIALIS) 20 MG tablet Take 1 tablet (20 mg) by mouth daily as needed (E.D.)       triamcinolone (KENALOG) 0.025 % external ointment Apply topically 2 times daily 80 g 0     No Known Allergies      Review of Systems:  -Heme/Lymph: no concerning bumps, no bleeding or bruising problems   -Constitutional: The patient denies fatigue, fevers, chills, unintended weight loss, and night sweats.  -HEENT: Patient denies nonhealing oral sores.  -Skin: As above in HPI. No additional skin concerns.    Physical exam:  Vitals: There were no vitals taken for this visit.  GEN: This is a well developed, well-nourished male in no acute distress, in a pleasant mood.    SKIN: Focused examination of the face and bilateral UEs was performed.  -R volar forearm with 1.5cm hyperpigmented, mildly scaly and raised plaque  -No other lesions of concern on areas examined.     Impression/Plan:  1. Rash - ddx: GA vs tinea vs dermatitis vs PSO vs Majocchi granuloma  -Punch biopsy:  After discussion of benefits and risks including but not limited to bleeding/bruising, pain/swelling, infection, scar, incomplete removal, nerve damage/numbness, recurrence, and non-diagnostic biopsy, written consent, verbal consent and photographs were obtained. Time-out was performed. The area was cleaned with isopropyl alcohol. 0.5mL of 1% lidocaine with 1:100,000 epinephrine was injected to obtain adequate anesthesia of the lesion on the R volar forearm.  A 4 mm punch biopsy was performed. 4-0 prolene sutures were utilized to approximate the epidermal edges. White petroleum jelly/Vaseline and a bandage was applied to the  wound. Explicit verbal and written wound care instructions were provided. The patient left the Dermatology Clinic in good condition. The patient was counseled to follow up for suture removal in approximately 10-14 days.  -Photograph was obtained for clinical monitoring and inclusion in medical record.  -Hold triamcinolone on the site for now as this can delay healing, will treat pending path result    CC Dr. Shirley on close of this encounter.  Follow-up prn for new or changing lesions.       Staff Involved:  Staff Only  All risks, benefits and alternatives were discussed with patient.  Patient is in agreement and understands the assessment and plan.  All questions were answered.    Dana Martin PA-C, MPAS  [facility] [place]: Phone: [phone], Fax: [phone]  [facility]: Phone: [phone],  Fax: [phone]

## 2020-09-18 NOTE — NURSING NOTE
Dermatology Rooming Note    Stefano Merino's goals for this visit include:   Chief Complaint   Patient presents with     Rash     Stefano is here today for a rash that is still itching      JONATHAN Meyer

## 2020-09-23 LAB — COPATH REPORT: NORMAL

## 2020-09-25 ENCOUNTER — TELEPHONE (OUTPATIENT)
Dept: DERMATOLOGY | Facility: CLINIC | Age: 60
End: 2020-09-25

## 2020-09-25 NOTE — TELEPHONE ENCOUNTER
[facility] Call Center    Phone Message    May a detailed message be left on voicemail: yes     Reason for Call: Pt is requesting to be contacted to schedule his stitch removal for next week.  Thanks.    Action Taken: Message routed to:  Adult Clinics: Dermatology p [id number]    Travel Screening: Not Applicable

## 2020-10-01 ENCOUNTER — ALLIED HEALTH/NURSE VISIT (OUTPATIENT)
Dept: DERMATOLOGY | Facility: CLINIC | Age: 60
End: 2020-10-01
Payer: COMMERCIAL

## 2020-10-01 DIAGNOSIS — Z48.02 VISIT FOR SUTURE REMOVAL: Primary | ICD-10-CM

## 2020-10-01 PROCEDURE — 99207 PR NO CHARGE LOS: CPT

## 2020-10-01 NOTE — PROGRESS NOTES
Stefano Merino comes into clinic today at the request of Dana Martin PA-C Ordering Provider for suture removal of R forearm. No s/s of infection. Area cleansed with alcohol swab. 2 sutures removed without complication. Vaseline and bandaid applied.     This service provided today was under the supervising provider of the day Dr Syed, who was available if needed.    Mini Diaz RN

## 2021-07-06 DIAGNOSIS — R06.09 OTHER FORM OF DYSPNEA: ICD-10-CM

## 2021-07-06 DIAGNOSIS — R09.89 AIR HUNGER: ICD-10-CM

## 2021-07-06 DIAGNOSIS — R05.9 COUGH: ICD-10-CM

## 2021-07-07 RX ORDER — MONTELUKAST SODIUM 10 MG/1
10 TABLET ORAL AT BEDTIME
Qty: 90 TABLET | Refills: 0 | Status: SHIPPED | OUTPATIENT
Start: 2021-07-07 | End: 2021-10-20

## 2021-08-03 ENCOUNTER — APPOINTMENT (OUTPATIENT)
Dept: URBAN - METROPOLITAN AREA CLINIC 259 | Age: 61
Setting detail: DERMATOLOGY
End: 2021-08-03

## 2021-08-03 DIAGNOSIS — R21 RASH AND OTHER NONSPECIFIC SKIN ERUPTION: ICD-10-CM

## 2021-08-03 DIAGNOSIS — L29.8 OTHER PRURITUS: ICD-10-CM

## 2021-08-03 PROCEDURE — 99212 OFFICE O/P EST SF 10 MIN: CPT | Mod: 25

## 2021-08-03 PROCEDURE — OTHER BIOPSY BY SHAVE METHOD: OTHER

## 2021-08-03 PROCEDURE — OTHER COUNSELING: OTHER

## 2021-08-03 PROCEDURE — OTHER MIPS QUALITY: OTHER

## 2021-08-03 PROCEDURE — 11102 TANGNTL BX SKIN SINGLE LES: CPT

## 2021-08-03 ASSESSMENT — LOCATION DETAILED DESCRIPTION DERM
LOCATION DETAILED: RIGHT PROXIMAL DORSAL FOREARM
LOCATION DETAILED: LEFT VENTRAL PROXIMAL FOREARM
LOCATION DETAILED: LEFT PROXIMAL DORSAL FOREARM

## 2021-08-03 ASSESSMENT — LOCATION ZONE DERM: LOCATION ZONE: ARM

## 2021-08-03 ASSESSMENT — LOCATION SIMPLE DESCRIPTION DERM
LOCATION SIMPLE: RIGHT FOREARM
LOCATION SIMPLE: LEFT FOREARM

## 2021-08-03 NOTE — PROCEDURE: BIOPSY BY SHAVE METHOD
Hemostasis: Drysol
Size Of Lesion In Cm: 0
Hide Accession Number?: No
Consent: Written consent was obtained and risks were reviewed including but not limited to scarring, infection, bleeding, scabbing, incomplete removal, nerve damage and allergy to anesthesia.
Curettage Text: The wound bed was treated with curettage after the biopsy was performed.
Dressing: bandage
Silver Nitrate Text: The wound bed was treated with silver nitrate after the biopsy was performed.
Billing Type: Third-Party Bill
Anesthesia Volume In Cc (Will Not Render If 0): 0.5
Body Location Override (Optional - Billing Will Still Be Based On Selected Body Map Location If Applicable): left forearm
Notification Instructions: Patient will be notified of biopsy results. However, patient instructed to call the office if not contacted within 2 weeks.
Type Of Destruction Used: Curettage
Electrodesiccation And Curettage Text: The wound bed was treated with electrodesiccation and curettage after the biopsy was performed.
Wound Care: Petrolatum
Electrodesiccation Text: The wound bed was treated with electrodesiccation after the biopsy was performed.
Biopsy Type: H and E
Was A Bandage Applied: Yes
Anesthesia Type: 1% lidocaine with epinephrine
Biopsy Method: Dermablade
Post-Care Instructions: I reviewed with the patient in detail post-care instructions. Patient is to keep the biopsy site dry overnight, and then apply bacitracin twice daily until healed. Patient may apply hydrogen peroxide soaks to remove any crusting.
Depth Of Biopsy: dermis
Cryotherapy Text: The wound bed was treated with cryotherapy after the biopsy was performed.
Detail Level: Detailed
Information: Selecting Yes will display possible errors in your note based on the variables you have selected. This validation is only offered as a suggestion for you. PLEASE NOTE THAT THE VALIDATION TEXT WILL BE REMOVED WHEN YOU FINALIZE YOUR NOTE. IF YOU WANT TO FAX A PRELIMINARY NOTE YOU WILL NEED TO TOGGLE THIS TO 'NO' IF YOU DO NOT WANT IT IN YOUR FAXED NOTE.

## 2021-08-09 ENCOUNTER — RX ONLY (RX ONLY)
Age: 61
End: 2021-08-09

## 2021-08-09 RX ORDER — FLUOCINONIDE 0.5 MG/G
CREAM TOPICAL
Qty: 1 | Refills: 1 | Status: ERX | COMMUNITY
Start: 2021-08-09

## 2021-08-26 ENCOUNTER — APPOINTMENT (OUTPATIENT)
Dept: URBAN - METROPOLITAN AREA CLINIC 259 | Age: 61
Setting detail: DERMATOLOGY
End: 2021-08-27

## 2021-08-26 DIAGNOSIS — L20.89 OTHER ATOPIC DERMATITIS: ICD-10-CM

## 2021-08-26 DIAGNOSIS — L29.8 OTHER PRURITUS: ICD-10-CM

## 2021-08-26 PROBLEM — L20.84 INTRINSIC (ALLERGIC) ECZEMA: Status: ACTIVE | Noted: 2021-08-26

## 2021-08-26 PROCEDURE — OTHER MIPS QUALITY: OTHER

## 2021-08-26 PROCEDURE — OTHER COUNSELING: OTHER

## 2021-08-26 PROCEDURE — 99213 OFFICE O/P EST LOW 20 MIN: CPT

## 2021-08-26 ASSESSMENT — LOCATION SIMPLE DESCRIPTION DERM
LOCATION SIMPLE: LEFT FOREARM
LOCATION SIMPLE: LOWER BACK
LOCATION SIMPLE: LEFT UPPER BACK
LOCATION SIMPLE: RIGHT CALF
LOCATION SIMPLE: LEFT CALF
LOCATION SIMPLE: RIGHT FOREARM

## 2021-08-26 ASSESSMENT — LOCATION DETAILED DESCRIPTION DERM
LOCATION DETAILED: RIGHT PROXIMAL CALF
LOCATION DETAILED: SUPERIOR LUMBAR SPINE
LOCATION DETAILED: RIGHT PROXIMAL DORSAL FOREARM
LOCATION DETAILED: LEFT PROXIMAL DORSAL FOREARM
LOCATION DETAILED: LEFT SUPERIOR UPPER BACK
LOCATION DETAILED: LEFT PROXIMAL CALF

## 2021-08-26 ASSESSMENT — LOCATION ZONE DERM
LOCATION ZONE: ARM
LOCATION ZONE: TRUNK
LOCATION ZONE: LEG

## 2021-08-26 NOTE — PROCEDURE: COUNSELING
Patient Specific Counseling (Will Not Stick From Patient To Patient): Patient advised to start otc Zyrtec, 2 tablets twice daily for itching
Detail Level: Detailed
Patient Specific Counseling (Will Not Stick From Patient To Patient): Patient will continue fluocinonide
Detail Level: Zone
Detail Level: Simple

## 2021-08-26 NOTE — PROCEDURE: MIPS QUALITY
Quality 431: Preventive Care And Screening: Unhealthy Alcohol Use - Screening: Patient screened for unhealthy alcohol use using a single question and scores less than 2 times per year
Quality 110: Preventive Care And Screening: Influenza Immunization: Influenza Immunization Ordered or Recommended, but not Administered due to system reason
Detail Level: Detailed
Quality 130: Documentation Of Current Medications In The Medical Record: Current Medications Documented
Quality 226: Preventive Care And Screening: Tobacco Use: Screening And Cessation Intervention: Patient screened for tobacco use and is an ex/non-smoker

## 2021-09-13 ENCOUNTER — RX ONLY (RX ONLY)
Age: 61
End: 2021-09-13

## 2021-09-13 RX ORDER — FLUOCINONIDE 0.5 MG/G
CREAM TOPICAL
Qty: 60 | Refills: 2 | Status: ERX

## 2021-10-10 ENCOUNTER — HEALTH MAINTENANCE LETTER (OUTPATIENT)
Age: 61
End: 2021-10-10

## 2021-10-15 ENCOUNTER — APPOINTMENT (OUTPATIENT)
Dept: URBAN - METROPOLITAN AREA CLINIC 259 | Age: 61
Setting detail: DERMATOLOGY
End: 2021-10-15

## 2021-10-15 DIAGNOSIS — L29.8 OTHER PRURITUS: ICD-10-CM

## 2021-10-15 DIAGNOSIS — L20.89 OTHER ATOPIC DERMATITIS: ICD-10-CM

## 2021-10-15 PROBLEM — L20.84 INTRINSIC (ALLERGIC) ECZEMA: Status: ACTIVE | Noted: 2021-10-15

## 2021-10-15 PROCEDURE — OTHER PRESCRIPTION MEDICATION MANAGEMENT: OTHER

## 2021-10-15 PROCEDURE — 99212 OFFICE O/P EST SF 10 MIN: CPT | Mod: 25

## 2021-10-15 PROCEDURE — OTHER COUNSELING: OTHER

## 2021-10-15 PROCEDURE — OTHER MIPS QUALITY: OTHER

## 2021-10-15 PROCEDURE — OTHER INTRALESIONAL KENALOG: OTHER

## 2021-10-15 PROCEDURE — 11900 INJECT SKIN LESIONS </W 7: CPT

## 2021-10-15 ASSESSMENT — LOCATION DETAILED DESCRIPTION DERM
LOCATION DETAILED: LEFT LATERAL UPPER BACK
LOCATION DETAILED: LEFT SUPERIOR UPPER BACK
LOCATION DETAILED: SUPERIOR LUMBAR SPINE
LOCATION DETAILED: LEFT PROXIMAL CALF
LOCATION DETAILED: RIGHT PROXIMAL DORSAL FOREARM
LOCATION DETAILED: LEFT PROXIMAL DORSAL FOREARM
LOCATION DETAILED: RIGHT PROXIMAL CALF

## 2021-10-15 ASSESSMENT — LOCATION SIMPLE DESCRIPTION DERM
LOCATION SIMPLE: LEFT CALF
LOCATION SIMPLE: RIGHT CALF
LOCATION SIMPLE: LOWER BACK
LOCATION SIMPLE: LEFT UPPER BACK
LOCATION SIMPLE: RIGHT FOREARM
LOCATION SIMPLE: LEFT FOREARM

## 2021-10-15 ASSESSMENT — LOCATION ZONE DERM
LOCATION ZONE: LEG
LOCATION ZONE: ARM
LOCATION ZONE: TRUNK

## 2021-10-15 NOTE — PROCEDURE: INTRALESIONAL KENALOG
Detail Level: Simple
Kenalog Preparation: Kenalog
X Size Of Lesion In Cm (Optional): 0
Validate Note Data When Using Inventory: Yes
Concentration Of Solution Injected (Mg/Ml): 5.0
Medical Necessity Clause: This procedure was medically necessary because the lesions that were treated were:
Include Z78.9 (Other Specified Conditions Influencing Health Status) As An Associated Diagnosis?: No
Treatment Number (Optional): 1
Consent: The risks of atrophy were reviewed with the patient.
Total Volume Injected (Ccs- Only Use Numbers And Decimals): 0.2

## 2021-10-15 NOTE — PROCEDURE: PRESCRIPTION MEDICATION MANAGEMENT
Continue Regimen: Fluocinonide 0.05% topical cream BI for up to three weeks at a time
Plan: Recommended the patient continue using the topical cream as needed. Along with taking 40mg of Zyrtec daily to help with the itch. Also recommended the patient use lotion more as winter comes. Lastly, if the ILK injection doesn’t help to resolve the eczema patch, he can come back into the clinic for another injection.
Render In Strict Bullet Format?: No
Detail Level: Zone

## 2021-10-15 NOTE — PROCEDURE: COUNSELING
Detail Level: Detailed
Patient Specific Counseling (Will Not Stick From Patient To Patient): Recommended the patient use a good moisturizing lotion for his dry skin. Eucerin roughness relief lotion samples were given to the patient.
Detail Level: Simple

## 2021-10-19 DIAGNOSIS — R09.89 AIR HUNGER: ICD-10-CM

## 2021-10-19 DIAGNOSIS — R06.09 OTHER FORM OF DYSPNEA: ICD-10-CM

## 2021-10-19 DIAGNOSIS — R05.9 COUGH: ICD-10-CM

## 2021-10-20 NOTE — TELEPHONE ENCOUNTER
montelukast (SINGULAIR) 10 MG tablet  Last Written Prescription Date:  7/7/2021  Last Fill Quantity: 90,   # refills: 0  Last Office Visit : 7/30/2020  Future Office visit:  None    Routing refill request to provider for review/approval because:  Second request      Over due office visit  30 day pended      Clinic notified      Lyudmila Crespo RN  Central Triage Red Flags/Med Refills

## 2021-10-21 RX ORDER — MONTELUKAST SODIUM 10 MG/1
10 TABLET ORAL AT BEDTIME
Qty: 30 TABLET | Refills: 1 | Status: SHIPPED | OUTPATIENT
Start: 2021-10-21 | End: 2022-03-21

## 2021-10-21 NOTE — TELEPHONE ENCOUNTER
Patient states he does not need this medication at the moment and will schedule an in person appointment when he needs the medication.

## 2021-11-09 DIAGNOSIS — N52.9 ERECTILE DYSFUNCTION OF ORGANIC ORIGIN: Primary | ICD-10-CM

## 2021-11-09 RX ORDER — SILDENAFIL 100 MG/1
50-100 TABLET, FILM COATED ORAL DAILY PRN
COMMUNITY
Start: 2021-01-31 | End: 2021-11-09

## 2021-11-09 RX ORDER — SILDENAFIL 100 MG/1
50-100 TABLET, FILM COATED ORAL DAILY PRN
Qty: 10 TABLET | Refills: 11 | Status: SHIPPED | OUTPATIENT
Start: 2021-11-09 | End: 2022-03-21

## 2022-03-17 ASSESSMENT — ENCOUNTER SYMPTOMS
SKIN CHANGES: 0
POOR WOUND HEALING: 0
POOR WOUND HEALING: 0
NAIL CHANGES: 0
NAIL CHANGES: 0
SKIN CHANGES: 0
SKIN CHANGES: 0
NAIL CHANGES: 0
POOR WOUND HEALING: 0

## 2022-03-17 ASSESSMENT — ANXIETY QUESTIONNAIRES
GAD7 TOTAL SCORE: 0
7. FEELING AFRAID AS IF SOMETHING AWFUL MIGHT HAPPEN: NOT AT ALL
5. BEING SO RESTLESS THAT IT IS HARD TO SIT STILL: NOT AT ALL
GAD7 TOTAL SCORE: 0
4. TROUBLE RELAXING: NOT AT ALL
1. FEELING NERVOUS, ANXIOUS, OR ON EDGE: NOT AT ALL
3. WORRYING TOO MUCH ABOUT DIFFERENT THINGS: NOT AT ALL
5. BEING SO RESTLESS THAT IT IS HARD TO SIT STILL: NOT AT ALL
4. TROUBLE RELAXING: NOT AT ALL
1. FEELING NERVOUS, ANXIOUS, OR ON EDGE: NOT AT ALL
2. NOT BEING ABLE TO STOP OR CONTROL WORRYING: NOT AT ALL
2. NOT BEING ABLE TO STOP OR CONTROL WORRYING: NOT AT ALL
3. WORRYING TOO MUCH ABOUT DIFFERENT THINGS: NOT AT ALL
7. FEELING AFRAID AS IF SOMETHING AWFUL MIGHT HAPPEN: NOT AT ALL
7. FEELING AFRAID AS IF SOMETHING AWFUL MIGHT HAPPEN: NOT AT ALL
6. BECOMING EASILY ANNOYED OR IRRITABLE: NOT AT ALL
7. FEELING AFRAID AS IF SOMETHING AWFUL MIGHT HAPPEN: NOT AT ALL
GAD7 TOTAL SCORE: 0
GAD7 TOTAL SCORE: 0
6. BECOMING EASILY ANNOYED OR IRRITABLE: NOT AT ALL

## 2022-03-18 ASSESSMENT — ANXIETY QUESTIONNAIRES
GAD7 TOTAL SCORE: 0
GAD7 TOTAL SCORE: 0

## 2022-03-21 ENCOUNTER — VIRTUAL VISIT (OUTPATIENT)
Dept: INTERNAL MEDICINE | Facility: CLINIC | Age: 62
End: 2022-03-21
Payer: COMMERCIAL

## 2022-03-21 DIAGNOSIS — Z00.00 ENCOUNTER FOR PREVENTATIVE ADULT HEALTH CARE EXAMINATION: Primary | ICD-10-CM

## 2022-03-21 PROCEDURE — 99207 PR NO CHARGE LOS: CPT

## 2022-03-21 RX ORDER — CETIRIZINE HYDROCHLORIDE 10 MG/1
10 TABLET ORAL DAILY
COMMUNITY

## 2022-03-21 RX ORDER — FLUOCINONIDE 0.5 MG/G
CREAM TOPICAL
COMMUNITY
Start: 2021-09-13 | End: 2023-06-07

## 2022-03-21 NOTE — PROGRESS NOTES
Health Maintenance:  Do you have a PCP? No  When was your last visit with your PCP?   When was your last eye exam? 7/30/2020  Have you ever had a colonoscopy? Yes   If yes, when? 7/29/2019  Have you ever had any polyps removed? Yes     As part of your visit we will set up a DEXA scan which will measure your body composition. We have a few questions that need to be answered before we can schedule this scan:   What is your approximate weight? 195   Have you ever had a DEXA scan within the past 2 years? Yes   Will you have any other imaging studies with contrast (x-ray, CT scan) within 7 days of this appointment? No   Have you had any spine or hip surgery? No   Do you take any vitamins that contain calcium or antacids with calcium? Yes    If yes, stop taking 24 hours prior to visit.     Goals for the Visit:  1. Thorough Comprehensive Preventive Exam  2. Derm concern  3.   Pertinent past Medical/Family and Social HX:   Pertinent sx that desire are addressed with this visit:     Answers for HPI/ROS submitted by the patient on 3/17/2022  SANJANA 7 TOTAL SCORE: 0  General Symptoms: No  Skin Symptoms: Yes  HENT Symptoms: No  EYE SYMPTOMS: No  HEART SYMPTOMS: No  LUNG SYMPTOMS: No  INTESTINAL SYMPTOMS: No  URINARY SYMPTOMS: No  REPRODUCTIVE SYMPTOMS: No  SKELETAL SYMPTOMS: No  BLOOD SYMPTOMS: No  NERVOUS SYSTEM SYMPTOMS: No  MENTAL HEALTH SYMPTOMS: No  Changes in hair: No  Changes in moles/birth marks: No  Itching: Yes  Rashes: No  Changes in nails: No  Acne: No  Change in facial hair: No  Warts: No  Non-healing sores: No  Scarring: No  Flaking of skin: No  Color changes of hands/feet in cold : No  Sun sensitivity: No  Skin thickening: No        Instructions prior to appointment:   1. Fast beginning at 10 pm for lab appointment  2. If your preventive care assessment package includes a Fitness Assessment, please bring athletic shoes. Complementary [employer] fitness attire is provided and yours to keep.  3. If  eye exam, eyes may be dilated, it will last 4-6 hours, may want to bring sunglasses.   4. May bring laptop or other work materials for use during downtime.   5. You will receive an email about 3 days prior to your visit with a final itinerary, menu selections for the complementary breakfast and lunch and instructions for the visit.     Complimentary  Parking provided. Drop off car in front of [facility], take the patient elevators to the [facility]. When you enter in the lobby, identify yourself as an [facility] [atient and you will be escorted up to the clinic.   If questions arise prior to your appointment please contact the clinic at [phone].

## 2022-03-28 ENCOUNTER — APPOINTMENT (OUTPATIENT)
Dept: INTERNAL MEDICINE | Facility: CLINIC | Age: 62
End: 2022-03-28
Payer: COMMERCIAL

## 2022-03-28 ENCOUNTER — OFFICE VISIT (OUTPATIENT)
Dept: AUDIOLOGY | Facility: CLINIC | Age: 62
End: 2022-03-28
Payer: COMMERCIAL

## 2022-03-28 ENCOUNTER — OFFICE VISIT (OUTPATIENT)
Dept: INTERNAL MEDICINE | Facility: CLINIC | Age: 62
End: 2022-03-28
Payer: COMMERCIAL

## 2022-03-28 ENCOUNTER — OFFICE VISIT (OUTPATIENT)
Dept: DERMATOLOGY | Facility: CLINIC | Age: 62
End: 2022-03-28
Payer: COMMERCIAL

## 2022-03-28 ENCOUNTER — ANCILLARY PROCEDURE (OUTPATIENT)
Dept: BONE DENSITY | Facility: CLINIC | Age: 62
End: 2022-03-28
Payer: COMMERCIAL

## 2022-03-28 ENCOUNTER — OFFICE VISIT (OUTPATIENT)
Dept: OPHTHALMOLOGY | Facility: CLINIC | Age: 62
End: 2022-03-28
Payer: COMMERCIAL

## 2022-03-28 VITALS
RESPIRATION RATE: 16 BRPM | OXYGEN SATURATION: 97 % | TEMPERATURE: 97.6 F | BODY MASS INDEX: 25.93 KG/M2 | HEIGHT: 74 IN | WEIGHT: 202 LBS

## 2022-03-28 DIAGNOSIS — Z98.890 HX OF LASIK: ICD-10-CM

## 2022-03-28 DIAGNOSIS — Z00.00 ENCOUNTER FOR PREVENTATIVE ADULT HEALTH CARE EXAMINATION: ICD-10-CM

## 2022-03-28 DIAGNOSIS — T88.7XXA MEDICATION SIDE EFFECTS: ICD-10-CM

## 2022-03-28 DIAGNOSIS — Z00.00 ENCOUNTER FOR PREVENTATIVE ADULT HEALTH CARE EXAMINATION: Primary | ICD-10-CM

## 2022-03-28 DIAGNOSIS — L30.9 DERMATITIS: ICD-10-CM

## 2022-03-28 DIAGNOSIS — D22.9 MULTIPLE BENIGN NEVI: ICD-10-CM

## 2022-03-28 DIAGNOSIS — H25.813 MIXED TYPE AGE-RELATED CATARACT, BOTH EYES: ICD-10-CM

## 2022-03-28 DIAGNOSIS — H52.4 PRESBYOPIA OF BOTH EYES: ICD-10-CM

## 2022-03-28 DIAGNOSIS — R09.89 THROAT CLEARING: ICD-10-CM

## 2022-03-28 DIAGNOSIS — N52.9 ERECTILE DYSFUNCTION, UNSPECIFIED ERECTILE DYSFUNCTION TYPE: ICD-10-CM

## 2022-03-28 DIAGNOSIS — Z82.49 FAMILY HISTORY OF ABDOMINAL AORTIC ANEURYSM (AAA): ICD-10-CM

## 2022-03-28 DIAGNOSIS — Z71.82 EXERCISE COUNSELING: Primary | ICD-10-CM

## 2022-03-28 DIAGNOSIS — Z12.83 SKIN CANCER SCREENING: Primary | ICD-10-CM

## 2022-03-28 DIAGNOSIS — Z01.10 EXAMINATION OF EARS AND HEARING: Primary | ICD-10-CM

## 2022-03-28 DIAGNOSIS — L82.1 SEBORRHEIC KERATOSIS: ICD-10-CM

## 2022-03-28 DIAGNOSIS — D18.01 CHERRY ANGIOMA: ICD-10-CM

## 2022-03-28 DIAGNOSIS — R09.82 POST-NASAL DRAINAGE: ICD-10-CM

## 2022-03-28 DIAGNOSIS — E78.5 DYSLIPIDEMIA: ICD-10-CM

## 2022-03-28 DIAGNOSIS — H52.13 MYOPIA, BILATERAL: Primary | ICD-10-CM

## 2022-03-28 LAB
ALBUMIN UR-MCNC: NEGATIVE MG/DL
ALP SERPL-CCNC: 53 U/L (ref 40–150)
ALT SERPL W P-5'-P-CCNC: 26 U/L (ref 0–70)
APPEARANCE UR: CLEAR
BASOPHILS # BLD AUTO: 0 10E3/UL (ref 0–0.2)
BASOPHILS NFR BLD AUTO: 1 %
BILIRUB UR QL STRIP: NEGATIVE
CHOLEST SERPL-MCNC: 208 MG/DL
COLOR UR AUTO: YELLOW
CREAT SERPL-MCNC: 0.92 MG/DL (ref 0.66–1.25)
DEPRECATED CALCIDIOL+CALCIFEROL SERPL-MC: 34 UG/L (ref 20–75)
EOSINOPHIL # BLD AUTO: 0.4 10E3/UL (ref 0–0.7)
EOSINOPHIL NFR BLD AUTO: 5 %
ERYTHROCYTE [DISTWIDTH] IN BLOOD BY AUTOMATED COUNT: 12 % (ref 10–15)
FASTING STATUS PATIENT QL REPORTED: ABNORMAL
FASTING STATUS PATIENT QL REPORTED: NORMAL
GFR SERPL CREATININE-BSD FRML MDRD: >90 ML/MIN/1.73M2
GLUCOSE BLD-MCNC: 98 MG/DL (ref 70–99)
GLUCOSE UR STRIP-MCNC: NEGATIVE MG/DL
HCT VFR BLD AUTO: 47.3 % (ref 40–53)
HDLC SERPL-MCNC: 44 MG/DL
HGB BLD-MCNC: 15.3 G/DL (ref 13.3–17.7)
HGB UR QL STRIP: NEGATIVE
HIV 1+2 AB+HIV1 P24 AG SERPL QL IA: NONREACTIVE
HOLD SPECIMEN: NORMAL
HOLD SPECIMEN: NORMAL
IMM GRANULOCYTES # BLD: 0 10E3/UL
IMM GRANULOCYTES NFR BLD: 0 %
KETONES UR STRIP-MCNC: NEGATIVE MG/DL
LDLC SERPL CALC-MCNC: 140 MG/DL
LEUKOCYTE ESTERASE UR QL STRIP: NEGATIVE
LYMPHOCYTES # BLD AUTO: 1.6 10E3/UL (ref 0.8–5.3)
LYMPHOCYTES NFR BLD AUTO: 22 %
MCH RBC QN AUTO: 29.5 PG (ref 26.5–33)
MCHC RBC AUTO-ENTMCNC: 32.3 G/DL (ref 31.5–36.5)
MCV RBC AUTO: 91 FL (ref 78–100)
MONOCYTES # BLD AUTO: 0.6 10E3/UL (ref 0–1.3)
MONOCYTES NFR BLD AUTO: 9 %
MUCOUS THREADS #/AREA URNS LPF: PRESENT /LPF
NEUTROPHILS # BLD AUTO: 4.4 10E3/UL (ref 1.6–8.3)
NEUTROPHILS NFR BLD AUTO: 63 %
NITRATE UR QL: NEGATIVE
NONHDLC SERPL-MCNC: 164 MG/DL
NRBC # BLD AUTO: 0 10E3/UL
NRBC BLD AUTO-RTO: 0 /100
PH UR STRIP: 6 [PH] (ref 5–7)
PLATELET # BLD AUTO: 223 10E3/UL (ref 150–450)
PROLACTIN SERPL-MCNC: 7 UG/L (ref 2–18)
PSA SERPL-MCNC: 1.09 UG/L (ref 0–4)
RBC # BLD AUTO: 5.19 10E6/UL (ref 4.4–5.9)
RBC URINE: 1 /HPF
SP GR UR STRIP: 1.03 (ref 1–1.03)
SQUAMOUS EPITHELIAL: <1 /HPF
TRIGL SERPL-MCNC: 118 MG/DL
TSH SERPL DL<=0.005 MIU/L-ACNC: 1.28 MU/L (ref 0.4–4)
UROBILINOGEN UR STRIP-MCNC: NORMAL MG/DL
WBC # BLD AUTO: 7 10E3/UL (ref 4–11)
WBC URINE: 1 /HPF

## 2022-03-28 PROCEDURE — 92567 TYMPANOMETRY: CPT | Performed by: AUDIOLOGIST

## 2022-03-28 PROCEDURE — 84146 ASSAY OF PROLACTIN: CPT | Mod: 90 | Performed by: PATHOLOGY

## 2022-03-28 PROCEDURE — 84443 ASSAY THYROID STIM HORMONE: CPT | Performed by: PATHOLOGY

## 2022-03-28 PROCEDURE — 82947 ASSAY GLUCOSE BLOOD QUANT: CPT | Performed by: PATHOLOGY

## 2022-03-28 PROCEDURE — 93010 ELECTROCARDIOGRAM REPORT: CPT | Performed by: INTERNAL MEDICINE

## 2022-03-28 PROCEDURE — 87389 HIV-1 AG W/HIV-1&-2 AB AG IA: CPT | Mod: 90 | Performed by: PATHOLOGY

## 2022-03-28 PROCEDURE — 36415 COLL VENOUS BLD VENIPUNCTURE: CPT | Performed by: PATHOLOGY

## 2022-03-28 PROCEDURE — 99207 PR NO CHARGE LOS: CPT

## 2022-03-28 PROCEDURE — 92557 COMPREHENSIVE HEARING TEST: CPT | Performed by: AUDIOLOGIST

## 2022-03-28 PROCEDURE — 77080 DXA BONE DENSITY AXIAL: CPT | Performed by: INTERNAL MEDICINE

## 2022-03-28 PROCEDURE — 81001 URINALYSIS AUTO W/SCOPE: CPT | Performed by: PATHOLOGY

## 2022-03-28 PROCEDURE — 99396 PREV VISIT EST AGE 40-64: CPT | Performed by: INTERNAL MEDICINE

## 2022-03-28 PROCEDURE — 85025 COMPLETE CBC W/AUTO DIFF WBC: CPT | Performed by: PATHOLOGY

## 2022-03-28 PROCEDURE — 92004 COMPRE OPH EXAM NEW PT 1/>: CPT | Performed by: OPHTHALMOLOGY

## 2022-03-28 PROCEDURE — 92015 DETERMINE REFRACTIVE STATE: CPT | Performed by: OPHTHALMOLOGY

## 2022-03-28 PROCEDURE — 80061 LIPID PANEL: CPT | Performed by: PATHOLOGY

## 2022-03-28 PROCEDURE — 99203 OFFICE O/P NEW LOW 30 MIN: CPT | Performed by: PHYSICIAN ASSISTANT

## 2022-03-28 PROCEDURE — 84460 ALANINE AMINO (ALT) (SGPT): CPT | Performed by: PATHOLOGY

## 2022-03-28 PROCEDURE — 82565 ASSAY OF CREATININE: CPT | Performed by: PATHOLOGY

## 2022-03-28 PROCEDURE — 99000 SPECIMEN HANDLING OFFICE-LAB: CPT | Performed by: PATHOLOGY

## 2022-03-28 PROCEDURE — 82306 VITAMIN D 25 HYDROXY: CPT | Mod: 90 | Performed by: PATHOLOGY

## 2022-03-28 PROCEDURE — 84403 ASSAY OF TOTAL TESTOSTERONE: CPT | Mod: 90 | Performed by: PATHOLOGY

## 2022-03-28 PROCEDURE — 84075 ASSAY ALKALINE PHOSPHATASE: CPT | Performed by: PATHOLOGY

## 2022-03-28 PROCEDURE — 96999 UNLISTED SPEC DERM SVC/PX: CPT | Performed by: INTERNAL MEDICINE

## 2022-03-28 PROCEDURE — G0103 PSA SCREENING: HCPCS | Performed by: PATHOLOGY

## 2022-03-28 PROCEDURE — 94375 RESPIRATORY FLOW VOLUME LOOP: CPT | Performed by: INTERNAL MEDICINE

## 2022-03-28 RX ORDER — FLUTICASONE PROPIONATE 50 MCG
1 SPRAY, SUSPENSION (ML) NASAL DAILY
Qty: 15.9 ML | Refills: 3 | Status: SHIPPED | OUTPATIENT
Start: 2022-03-28 | End: 2023-05-04

## 2022-03-28 RX ORDER — ATORVASTATIN CALCIUM 10 MG/1
10 TABLET, FILM COATED ORAL DAILY
Qty: 90 TABLET | Refills: 3 | Status: SHIPPED | OUTPATIENT
Start: 2022-03-28 | End: 2023-05-09

## 2022-03-28 RX ORDER — VARDENAFIL HYDROCHLORIDE 20 MG/1
20 TABLET ORAL DAILY PRN
Qty: 20 TABLET | Refills: 4 | Status: SHIPPED | OUTPATIENT
Start: 2022-03-28

## 2022-03-28 ASSESSMENT — VISUAL ACUITY
OS_CC: 20/40
CORRECTION_TYPE: GLASSES
OS_PH_CC+: -1
OS_PH_CC: 20//20
METHOD: SNELLEN - LINEAR
OD_CC: 20/20

## 2022-03-28 ASSESSMENT — REFRACTION_WEARINGRX
OS_SPHERE: -0.25
OD_CYLINDER: SPHERE
OS_CYLINDER: +0.25
SPECS_TYPE: SVL
OD_SPHERE: -0.75
OS_AXIS: 152

## 2022-03-28 ASSESSMENT — REFRACTION_MANIFEST
OD_ADD: +1.75
OD_SPHERE: -1.00
OS_CYLINDER: SPHERE
OD_CYLINDER: SPHERE
OS_SPHERE: -1.50
OS_ADD: +1.75

## 2022-03-28 ASSESSMENT — EXTERNAL EXAM - RIGHT EYE: OD_EXAM: NORMAL

## 2022-03-28 ASSESSMENT — PAIN SCALES - GENERAL
PAINLEVEL: NO PAIN (0)
PAINLEVEL: NO PAIN (0)

## 2022-03-28 ASSESSMENT — TONOMETRY
IOP_METHOD: TONOPEN
OS_IOP_MMHG: 11
OD_IOP_MMHG: 10

## 2022-03-28 ASSESSMENT — CUP TO DISC RATIO
OD_RATIO: 0.2
OS_RATIO: 0.2

## 2022-03-28 ASSESSMENT — EXTERNAL EXAM - LEFT EYE: OS_EXAM: NORMAL

## 2022-03-28 ASSESSMENT — CONF VISUAL FIELD
OD_NORMAL: 1
OS_NORMAL: 1
METHOD: COUNTING FINGERS

## 2022-03-28 ASSESSMENT — SLIT LAMP EXAM - LIDS
COMMENTS: NORMAL
COMMENTS: NORMAL

## 2022-03-28 NOTE — LETTER
3/28/2022       RE: Stefano Merino  [address]  [place] [address]     Dear Colleague,    Thank you for referring your patient, Stefano Merino, to the [facility] [place] at [facility]. Please see a copy of my visit note below.    [facility] Dermatology Note  Encounter Date: Mar 28, 2022  Office Visit     Dermatology Problem List:  1. Dermatitis - R volar forearm - s/p bx 9/18/20  - triamcinolone, fluocinonide  ____________________________________________    Assessment & Plan:    # Hx nummular dermatitis  - Continue fluocinonide prn for flares  - Regular moisturization recommended    # Cherry angiomas  # Seborrheic keratoses  Discussed the natural history and benign nature of this lesion. Reassurance provided that no additional treatment is necessary.      # Multiple benign nevi  - No concerning lesions today  - Monitor for ABCDEs of melanoma   - Continue sun protection - recommend SPF 30 or higher with frequent application   - Return sooner if noticing changing or symptomatic lesions     Procedures Performed:   None    Follow-up: 1 year(s) in-person, or earlier for new or changing lesions    Staff and Scribe:     Scribe Disclosure:  I, Chintan Rogel, am serving as a scribe to document services personally performed by Laurle Vazquez PA-C based on data collection and the provider's statements to me.     Provider Disclosure:   The documentation recorded by the scribe accurately reflects the services I personally performed and the decisions made by me.    All risks, benefits and alternatives were discussed with patient.  Patient is in agreement and understands the assessment and plan.  All questions were answered.  Sun Screen Education was given.   Return to Clinic annually or sooner as needed.   Laurel Vazquez PA-C   [facility]    ____________________________________________    CC: Skin Check (recently diagnosed eczema on elbows )    HPI:  Mr. Stefano Merino is a(n) 61 year old male who presents today as a return patient for FBSE. Last seen in dermatology by Dana Martin PA-C, on 9/18/2020, at which time patient underwent punch biopsy for diagnosis of rash which returned as dermatitis.    Today, patient reports that his eczema has been well controlled on fluocinonide.    Patient is otherwise feeling well, without additional skin concerns.    Labs Reviewed:  N/A    Physical Exam:  Vitals: There were no vitals taken for this visit.  SKIN: Total skin excluding the undergarment areas was performed. The exam included the head/face, neck, both arms, chest, back, abdomen, both legs, digits and/or nails.   - Bailey skin type: 4/5  - There are dome shaped bright red papules on the trunk and extremities.   - Multiple regular brown pigmented macules and papules are identified on the trunk and extremities.   - There are waxy stuck on tan to brown papules on the trunk and extremities.  - No scaly plaques at this time.  - Skin well hydrated.  - No other lesions of concern on areas examined.     Medications:  Current Outpatient Medications   Medication     cetirizine (ZYRTEC) 10 MG tablet     Cholecalciferol (VITAMIN D3) 25 MCG (1000 UT) CAPS     diphenhydrAMINE-APAP, sleep, (TYLENOL PM EXTRA STRENGTH PO)     fluocinonide (LIDEX) 0.05 % external cream     tadalafil (CIALIS) 20 MG tablet     triamcinolone (KENALOG) 0.025 % external ointment     vardenafil (LEVITRA) 20 MG tablet     No current facility-administered medications for this visit.      Past Medical History:   Patient Active Problem List   Diagnosis     Erectile dysfunction of organic origin     Allergic rhinitis     Dyslipidemia     Past Medical History:   Diagnosis Date     Colon polyps      Hyperlipidemia over fiftenn years    HAve not had to medicate     Seasonal allergies         CC  Referred Self, MD  No address on file on close of this encounter.

## 2022-03-28 NOTE — PATIENT INSTRUCTIONS
Stefano,    It was good to see you after a couple of years. As before, your routine is good overall. Continue your routine of hot yoga, but add 1-2 days of cardio per week now that the weather is warming. I only have a few other recommendations for you.    Weight training    If your elbow begins to bother you too much, see another specialist to resolve the issue. If your elbow can tolerate it, add weight to your exercises to make them more challenging.    Cardiovascular exercise    Your VO2max is slightly lower than it was two years ago, but that is somewhat to be expected as you get older. I also suspect that it is lower because last time I saw you in July, so you had been running and cycling for a few months. I have no doubt that you will add additional cardiovascular exercise outdoors as the weather gets warmer. However, the change I recommend is adding 1-2 days per week of cardio indoors during the colder months so you can keep your fitness higher year round.    If you have questions, please email me at [email or web address].    Sincerely,    Juan Jose Ramos MS, Exercise Phsiology

## 2022-03-28 NOTE — OUTPATIENT NURSE NOTE
03/28/22 0958   Fitness   Current Fitness Regimen:  Exercise: hot yoga 3 d/wk 60 minutes. Includes cardio, weights, and stretching. Moderate continuous cardio on machines whil traveling for work. Physical activity: walks while golfing every other outing, walking a work.   Fitness Goals Reduce body fat percentage. Add running and cycling in warmer months.   Timeline   Recommended Activity this Week Continue current routine   Recommended Minutes per Day this Week 60 Min   Recommended Number of Days this Week 3 Per Day/Per Week   Recommended Activity this Month As above, but add 1 d/wk cycling or running outdoors   Recommended Duration this Month 60 Min/Hrs   Recommended Frequency this Month 4 Per Day/Per Week   Recommended Activity the Nex 3 Months As above, but increase outdoor cardio to 2 d/wk   Recommended Duration the Nex 3 Months 60 Min/Hrs   Recommended Frequency the Nex 3 Months:  5 Per Day/Per Week   VO2 Max   VO2MAX:  35.1 ml/kg/min   VO2- max Percentile 35 %   Fitness Level Poor    Strength    Strength (Right):  103.5 ml/kg/min    Strength (Left) 93.8 ml/kg/min

## 2022-03-28 NOTE — NURSING NOTE
Chief Complaint   Patient presents with     Physical     Patient is here for annual physical     Digna Trevizo CMA 7:06 AM on 3/28/2022.

## 2022-03-28 NOTE — PROGRESS NOTES
[facility] Dermatology Note  Encounter Date: Mar 28, 2022  Office Visit     Dermatology Problem List:  1. Dermatitis - R volar forearm - s/p bx 9/18/20  - triamcinolone, fluocinonide  ____________________________________________    Assessment & Plan:    # Hx nummular dermatitis  - Continue fluocinonide prn for flares  - Regular moisturization recommended    # Cherry angiomas  # Seborrheic keratoses  Discussed the natural history and benign nature of this lesion. Reassurance provided that no additional treatment is necessary.      # Multiple benign nevi  - No concerning lesions today  - Monitor for ABCDEs of melanoma   - Continue sun protection - recommend SPF 30 or higher with frequent application   - Return sooner if noticing changing or symptomatic lesions     Procedures Performed:   None    Follow-up: 1 year(s) in-person, or earlier for new or changing lesions    Staff and Scribe:     Scribe Disclosure:  I, Chintan Rogel, am serving as a scribe to document services personally performed by Laurel Vazquez PA-C based on data collection and the provider's statements to me.     Provider Disclosure:   The documentation recorded by the scribe accurately reflects the services I personally performed and the decisions made by me.    All risks, benefits and alternatives were discussed with patient.  Patient is in agreement and understands the assessment and plan.  All questions were answered.  Sun Screen Education was given.   Return to Clinic annually or sooner as needed.   Laurel Vazquez PA-C   [facility]   ____________________________________________    CC: Skin Check (recently diagnosed eczema on elbows )    HPI:  Mr. Stefano Merino is a(n) 61 year old male who presents today as a return patient for FBSE. Last seen in dermatology by Dana Martin PA-C, on 9/18/2020, at which time patient underwent punch biopsy for diagnosis of rash which returned as  dermatitis.    Today, patient reports that his eczema has been well controlled on fluocinonide.    Patient is otherwise feeling well, without additional skin concerns.    Labs Reviewed:  N/A    Physical Exam:  Vitals: There were no vitals taken for this visit.  SKIN: Total skin excluding the undergarment areas was performed. The exam included the head/face, neck, both arms, chest, back, abdomen, both legs, digits and/or nails.   - Bailey skin type: 4/5  - There are dome shaped bright red papules on the trunk and extremities.   - Multiple regular brown pigmented macules and papules are identified on the trunk and extremities.   - There are waxy stuck on tan to brown papules on the trunk and extremities.  - No scaly plaques at this time.  - Skin well hydrated.  - No other lesions of concern on areas examined.     Medications:  Current Outpatient Medications   Medication     cetirizine (ZYRTEC) 10 MG tablet     Cholecalciferol (VITAMIN D3) 25 MCG (1000 UT) CAPS     diphenhydrAMINE-APAP, sleep, (TYLENOL PM EXTRA STRENGTH PO)     fluocinonide (LIDEX) 0.05 % external cream     tadalafil (CIALIS) 20 MG tablet     triamcinolone (KENALOG) 0.025 % external ointment     vardenafil (LEVITRA) 20 MG tablet     No current facility-administered medications for this visit.      Past Medical History:   Patient Active Problem List   Diagnosis     Erectile dysfunction of organic origin     Allergic rhinitis     Dyslipidemia     Past Medical History:   Diagnosis Date     Colon polyps      Hyperlipidemia over fiftenn years    HAve not had to medicate     Seasonal allergies         CC Referred Self, MD  No address on file on close of this encounter.

## 2022-03-28 NOTE — PATIENT INSTRUCTIONS
Patient Education     Checking for Skin Cancer  You can find cancer early by checking your skin each month. There are 3 kinds of skin cancer. They are melanoma, basal cell carcinoma, and squamous cell carcinoma. Doing monthly skin checks is the best way to find new marks or skin changes. Follow the instructions below for checking your skin.   The ABCDEs of checking moles for melanoma   Check your moles or growths for signs of melanoma using ABCDE:     Asymmetry: the sides of the mole or growth don t match    Border: the edges are ragged, notched, or blurred    Color: the color within the mole or growth varies    Diameter: the mole or growth is larger than 6 mm (size of a pencil eraser)    Evolving: the size, shape, or color of the mole or growth is changing (evolving is not shown in the images below)    Checking for other types of skin cancer  Basal cell carcinoma or squamous cell carcinoma have symptoms such as:       A spot or mole that looks different from all other marks on your skin    Changes in how an area feels, such as itching, tenderness, or pain    Changes in the skin's surface, such as oozing, bleeding, or scaliness    A sore that does not heal    New swelling or redness beyond the border of a mole    Who s at risk?  Anyone can get skin cancer. But you are at greater risk if you have:     Fair skin, light-colored hair, or light-colored eyes    Many moles or abnormal moles on your skin    A history of sunburns from sunlight or tanning beds    A family history of skin cancer    A history of exposure to radiation or chemicals    A weakened immune system  If you have had skin cancer in the past, you are at risk for recurring skin cancer.   How to check your skin  Do your monthly skin checkups in front of a full-length mirror. Check all parts of your body, including your:     Head (ears, face, neck, and scalp)    Torso (front, back, and sides)    Arms (tops, undersides, upper, and lower armpits)    Hands  (palms, backs, and fingers, including under the nails)    Buttocks and genitals    Legs (front, back, and sides)    Feet (tops, soles, toes, including under the nails, and between toes)  If you have a lot of moles, take digital photos of them each month. Make sure to take photos both up close and from a distance. These can help you see if any moles change over time.   Most skin changes are not cancer. But if you see any changes in your skin, call your doctor right away. Only he or she can diagnose a problem. If you have skin cancer, seeing your doctor can be the first step toward getting the treatment that could save your life.   [employer] last reviewed this educational content on 4/1/2019 2000-2020 [employer]. [address] [place] [address]. All rights reserved. This information is not intended as a substitute for professional medical care. Always follow your healthcare professional's instructions.       When should I call my doctor?    If you are worsening or not improving, please, contact us or seek urgent care as noted below.     Who should I call with questions (adults)?    [facility] (adult and pediatric): [phone]    [facility] (adult): [phone]    For urgent needs outside of business hours call the [facility] at [phone] and ask for the dermatology resident on call to be paged    If this is a medical emergency and you are unable to reach an ER, Call [phone]    Who should I call with questions (pediatric)?  [facility]- Pediatric Dermatology  Dr. Ayesha Fernandes, Dr. Kristy Fregoso, Dr. Itzel Aponte, FORETS Tomas, Dr. Charmaine Parker, Dr. Kezia Newton & Dr. Deep Emmanuel  Non-urgent nurse triage line; [phone]- Felicia and Uzma BRUSH Care Coordinatorhero Alexandre (/Complex ) [phone]    If you need a prescription refill, please contact your  pharmacy. Refills are approved or denied by our Physicians during normal business hours, Monday through Fridays  Per office policy, refills will not be granted if you have not been seen within the past year (or sooner depending on your child's condition)    Scheduling Information:  Pediatric Appointment Scheduling and Call Center [phone]  Radiology Scheduling- [phone]  Sedation Unit Scheduling- [phone]  [place] Scheduling- [facility] [phone]; Pediatric Dermatology [phone]  Main  Services: [phone]  Slovak: [phone]  Nepalese: [phone]  Hmong/Burundian/Antwon: [phone]  Preadmission Nursing Department Fax Number: [phone] (Fax all pre-operative paperwork to this number)    For urgent matters arising during evenings, weekends, or holidays that cannot wait for normal business hours please call [phone] and ask for the dermatology resident on call to be paged.

## 2022-03-28 NOTE — PROGRESS NOTES
HPI  Stefano Merino is a 61 year old male here for comprehensive eye exam. Uses glasses for driving at night occasionally and OTC readers in low lighting occasionally. History of laser in situ keratomileusis (LASIK) both eyes.  Denies eye pain or irritation. Does not take eye drops.     PMH:   Past Medical History:   Diagnosis Date     Colon polyps      Hyperlipidemia over fiftenn years    HAve not had to medicate     Seasonal allergies       POH: Glasses for myopia after laser in situ keratomileusis (LASIK) both eyes cataracts,  no surgery, no trauma  Oc Meds: none  FH: Denies any glaucoma, age related macular degeneration, or other known eye diseases other than daughter with congenital cataracts        Assessment & Plan      (H52.13) Myopia, bilateral - Both Eyes  (primary encounter diagnosis)  Comment: with presbyopia but discussed with patient that mild uncorrected myopia is helping with near vision  Could try Vuity as needed (unsure of prior myopic correction need to discuss with patient as Vuity not studied in -4D or over)  Plan: manifest refraction done and prescription for glasses given     (Z98.890) Hx of LASIK - Both Eyes  Comment: clear interface  Plan: follow    (H25.813) Mixed type age-related cataract, both eyes - Both Eyes  Comment: early, not visually significant, counseled patient that it could cause mild glare/halos and refractive changes over time that can be compensated with new glasses   Plan: new prescription given, observe    -----------------------------------------------------------------------------------       Patient disposition:   Return in about 1 year (around 3/28/2023) for Comprehensive Exam. or patient to call sooner as needed.      Complete documentation of historical and exam elements from today's encounter can be found in the full encounter summary report (not reduplicated in this progress note). I personally obtained the chief complaint(s) and history of present illness.  I have  confirmed and edited as necessary the CC, HPI, PMH/PSH, social history, FMH, ROS, and exam/neuro findings as obtained by the technician or others. I have examined this patient myself and I personally viewed the image(s) and studies listed above and the documentation reflects my findings and interpretation.  I formulated and edited as necessary the assessment and plan and discussed the findings and management plan with the patient and family.     Aleisha Key MD

## 2022-03-28 NOTE — OUTPATIENT NURSE NOTE
03/28/22 0958   Fitness   Current Fitness Regimen:  Exercise: hot yoga 3 d/wk 60 minutes. Includes cardio, weights, and stretching. Moderate continuous cardio on machines whil traveling for work. Physical activity: walks while golfing every other outing, walking at work.   Fitness Goals Reduce body fat percentage. Add running and cycling in warmer months.   Timeline   Recommended Activity this Week Continue current routine   Recommended Minutes per Day this Week 60 Min   Recommended Number of Days this Week 3 Per Day/Per Week   Recommended Activity this Month As above, but add 1 d/wk cycling or running outdoors   Recommended Duration this Month 60 Min/Hrs   Recommended Frequency this Month 4 Per Day/Per Week   Recommended Activity the Nex 3 Months As above, but increase outdoor cardio to 2 d/wk   Recommended Duration the Nex 3 Months 60 Min/Hrs   Recommended Frequency the Nex 3 Months:  5 Per Day/Per Week   VO2 Max   VO2MAX:  35.1 ml/kg/min   VO2- max Percentile 35 %   Fitness Level Poor    Strength    Strength (Right):  103.5 ml/kg/min    Strength (Left) 93.8 ml/kg/min

## 2022-03-28 NOTE — PROGRESS NOTES
History and Physical Examination     SUBJECTIVE: Chief complaint: preventive health review.     Past Medical History:  1.  Allergic rhinitis, typically noted in hope  2.  History of adenomatous colonic polyps    3.  Dyslipidemia  4.  Status post excision of benign tumor, right pretibial region, circa 1990  5.  Status post bilateral LASIK   6.  Erectile dysfunction  7.  Left atrial enlargement  8.  Family history of AAA rupture  9.  Eczema     Adverse Drug Reactions: None.     Current Medications:  Vitamin D3, 1000 international units daily  Acetaminophen-diphenhydramine (Tylenol PM), nightly as needed  Tadalafil, 20 mg daily as needed, used approximately twice per week.  Cetirizine, 10 mg daily as needed  Fluocinonide, 0.05%, apply topically as needed  Daily multivitamin supplement (for men over age 50)    Habits:  Tobacco: Never  Alcohol: 1-2 servings, one day per week  Caffeine: 3 servings of tea per week.  Street drugs: None     Social History:  (2011) and currently in a mutually monogamous relationship with Dayana, a female partner who works in real estate.  Father of 2 children from his 20-year marriage: daughter Kristina, 25, a [employer] graduate who was a competitive swimmer in high school and now works in [facility] for [employer]; and daughter Kaley, age 21, who enjoys "[employer]", currently a oumou at [employer] in [place].  Stefano is a native of the [place] area who attended [employer], where he studied economics, before attending graduate school at [employer].  He moved to the [facility] in 2018 after working in [place] and [place].  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  Away from work, he enjoys golf and other exercise.  Typically he exercises by working out with hand weights or on an elliptical device for 45 minutes, 2-3 times per week; or  "engaging in \"warrior sculpt\" yoga for 60 minutes, 2-3 times per week.     Family History: Mother  in  at age 98; she was a retired nurse anesthetist with history of breast cancer, hypertension, vertigo, and dementia.  Father  at age 72 from AAA rupture, with no history of tobacco use or known medical problems.  A sister  at approximately age 50 from AIDS.  A sister 4 years his senior is a breast cancer survivor.  Younger daughter has congenital cataracts; older daughter is in good health.  Paternal grandparents  in their 80s.  Medical history of maternal grandparents is not known.     Review of Systems: Unchanged mild initial insomnia, managed with acetaminophen-diphenhydramine, which also helps treat allergic rhinitis symptoms.  Known snoring, without known gasping, observed apnea, or daytime somnolence.  Excessive throat clearing, without sputum production, most pronounced in the morning, and apparently somewhat worse after consumption of a smoothie containing banana, frozen fruit, almonds, and protein powder; no associated sinus congestion or pain, wheezing, dyspnea, nausea, sour taste, or abdominal pain.  Recurrent pattern of persistent non-productive cough following URIs.  Mild dysphagia noted exclusively after use of tadalafil.  Persistent erectile dysfunction, improved with use of tadalafil; intact sexual drive.  Most recent colonoscopy was negative in 2019.  Most recent tetanus (Tdap) was administered in 2020.  Recombinant zoster vaccination series completed in 10/2020.  Covid (Pfizer) vaccinations administered on 2021, 2021, and 2021.  Remainder of complete review of systems was negative.     OBJECTIVE:     Vital signs: Height 73.75 inches.  Weight 202 pounds.  Blood pressure 128/82 on average of 3 automated readings.  Heart rate 68.  Respiratory rate 16.  O2 saturation 97% on room air.  General: Alert, neatly dressed and groomed, in no acute distress.  HEENT: " "Atraumatic and normocephalic. Eyelids, pupils, and conjunctivae appeared normal. Lips, teeth and gums appear normal.  Oropharynx showed moist mucous membranes, without exudate or erythema; somewhat \"crowded\" soft palate with mildly narrowed airway.  Neck: Supple, without thyromegaly, mass, or bruit. No cervical or supraclavicular lymphadenopathy.  Back: No spinal or costovertebral angle tenderness.  Chest: Clear to auscultation and percussion. Normal respiratory effort.  Cardiovascular: No jugular venous distention. Regular rate and rhythm, normal S1, S2 without murmur.  Abdomen: Bowel sounds positive; soft, nontender, without rebound, guarding, hepatosplenomegaly or mass.  Extremities: No cyanosis or edema.  Genitalia: Normal male genitalia, without scrotal mass or hernia. No inguinal lymphadenopathy.  Rectal: Normal tone, with smooth, nontender, non-enlarged prostate. No rectal mass.  Skin: Examination was deferred; full evaluation was completed earlier in day through dermatology clinic.  Neurologic: Cranial nerves II-XII were grossly intact. Sensory and motor examinations were normal. Normal gait.  Mini-cog score was 4/5 (5/5, with minimal prompting).  Psychiatric: Alert and oriented ×3. Normal affect. Judgment and insight intact.  SANJANA-7 score was 0.  PHQ-2 score was 0.     Creatinine 0.92, alkaline phosphatase 53, ALT 26, cholesterol 208, HDL 44, , triglycerides 118, cholesterol/HDL 4.7, PSA 1.09, TSH 1.28, 25-hydroxy vitamin D 34, glucose 98, white blood cell count 7000, hemoglobin 15.3, platelets 223,000, HIV screen nonreactive.  Urinalysis unremarkable.     EKG was unremarkable.  Spirometry showed an FEV1 of 3.74, with an FVC of 4.89; readings were 98% and 95% of predicted values, respectively.     Preliminary DEXA showed normal bone density, with most negative and valid T-score of -0.9 at the level of the right femoral neck.  Body composition analysis showed 26.0% fat (26th percentile); 7/2020 reading " "showed 23.1% fat; current body mass index 26.7; 7/2020 reading was 25.29.     ASSESSMENT:     1.  Excessive throat clearing.  Stefano believes his symptoms are most likely related to postnasal drainage.  While he denies sinus pain, purulent drainage, and fever, he does have a history of allergic rhinitis, albeit with different symptoms.  As an initial measure, he will increase cetirizine to 10 mg twice daily and follow symptoms; if no better after 3 days, he will add fluticasone nasal inhaler, 1 puff each nostril daily, allowing 7-10 days for full effect.  If symptoms persist, he will discontinue fluticasone, revert to once daily cetirizine, and begin omeprazole, 20 mg daily.  If throat clearing sensation does not improve after 2 weeks of omeprazole treatment, he will pursue ENT consultation.  He will be advised to arrange further evaluation in the event of progressive symptoms such as fever, sinus pain, etc.     2.  Insomnia.  Mild initial insomnia, managed with diphenhydramine.  He was again advised to follow usual \"sleep hygiene\" recommendations, and to consider use of melatonin, 0.3-0.5 mg daily at bedtime.  Although he denies signs or symptoms of sleep apnea, we agreed that he would ask Dayana to monitor his breathing during sleep.  Based on his history of snoring in the setting of a relatively narrow airway, Stefano agrees to arrange sleep clinic evaluation if Dayana notes loud snoring, gasping, or pauses in breathing during sleep.     3.  Dyslipidemia.  Using current readings and guidelines from the AHA/ACC, his estimated 10-year risk of a vascular event is 8.3% (optimal for his cohort is 5.8%).  I recommended treatment with atorvastatin, 10 mg daily at bedtime.  Potential side effects were discussed. We reviewed the importance of non-pharmacologic measures, including maintenance of ideal weight, additional exercise, and adherence to a healthful diet, the elements of which were reviewed in detail.  He agrees to " return for fasting cholesterol fractionation and ALT after 6 weeks.    4.  Family history of AAA.  In the setting of heightened risk for vascular disease, I recommended abdominal ultrasound to exclude AAA based on his father's history of ruptured AAA despite never having used tobacco.     5.  Erectile dysfunction.  Suboptimal response to tadalafil.  I will attempt to add prolactin and testosterone levels to previously collected laboratory specimen.  With history of suboptimal response to sildenafil, we will begin vardenafil, 20 mg daily as needed and monitor response.    6.  Preventive care.  Immunization status is up-to-date.  Colonoscopy was completed in 2019 and will be due in 2024.  He was congratulated for his regimen of regular exercise, but advised to introduce more rigorous aerobic exercise.  I recommended daily use of a 50 mcg vitamin D3 supplement, while maintaining daily calcium intake of 1200 mg, preferably from dietary sources.  We reviewed the roles of various types of exercise and the elements of a healthful diet.    PLAN: See above.     ~SRT  Answers for HPI/ROS submitted by the patient on 3/17/2022  SANJANA 7 TOTAL SCORE: 0  General Symptoms: No  Skin Symptoms: Yes  HENT Symptoms: No  EYE SYMPTOMS: No  HEART SYMPTOMS: No  LUNG SYMPTOMS: No  INTESTINAL SYMPTOMS: No  URINARY SYMPTOMS: No  REPRODUCTIVE SYMPTOMS: No  SKELETAL SYMPTOMS: No  BLOOD SYMPTOMS: No  NERVOUS SYSTEM SYMPTOMS: No  MENTAL HEALTH SYMPTOMS: No  Changes in hair: No  Changes in moles/birth marks: No  Itching: Yes  Rashes: No  Changes in nails: No  Acne: No  Change in facial hair: No  Warts: No  Non-healing sores: No  Scarring: No  Flaking of skin: No  Color changes of hands/feet in cold : No  Sun sensitivity: No  Skin thickening: No

## 2022-03-28 NOTE — NURSING NOTE
Chief Complaints and History of Present Illnesses   Patient presents with     COMPREHENSIVE EYE EXAM     Chief Complaint(s) and History of Present Illness(es)     COMPREHENSIVE EYE EXAM     Laterality: both eyes    Associated symptoms: Negative for eye pain, dryness, tearing, flashes and floaters    Pain scale: 0/10              Comments     Pt notes that he wears gls just for driving, he feels that they may need to be updated    Dinorah LOPEZ March 28, 2022 8:06 AM

## 2022-03-28 NOTE — PROGRESS NOTES
AUDIOLOGY REPORT  Signature health assessment.    SUMMARY: Audiology visit completed. See audiogram for results.      RECOMMENDATIONS: Repeat hearing evaluation as medically indicated, sooner if concerns arise.        Yola Castro  Audiologist  [place] License  #[id number]

## 2022-03-28 NOTE — LETTER
3/28/2022       RE: Stefano Merino  [address]  [place] [address]     Dear Colleague,    Thank you for referring your patient, Stefano Merino, to the [facility]. Please see a copy of my visit note below.    History and Physical Examination     SUBJECTIVE: Chief complaint: preventive health review.     Past Medical History:  1.  Allergic rhinitis, typically noted in hope  2.  History of adenomatous colonic polyps    3.  Dyslipidemia  4.  Status post excision of benign tumor, right pretibial region, circa 1990  5.  Status post bilateral LASIK procedures  6.  Erectile dysfunction  7.  Left atrial enlargement  8.  Family history of AAA rupture.  9.  Eczema     Adverse Drug Reactions: None.     Current Medications:  Vitamin D3, 1000 international units daily  Acetaminophen-diphenhydramine (Tylenol PM), nightly as needed  Tadalafil, 20 mg daily as needed, used approximately twice per week.  Cetirizine, 10 mg daily as needed  Fluocinonide, 0.05%, apply topically as needed  Daily multivitamin supplement (for men greater than 50 years)    Habits:  Tobacco: Never  Alcohol: 1-2 servings, one day per week  Caffeine: 3 servings of tea per week.  Street drugs: None     Social History:  (2011) and currently in a mutually monogamous relationship with Dayana, a female partner who works in real estate.  Father of 2 children from his 20-year marriage: daughter Kristina, 25, a [employer] graduate who was a competitive swimmer in high school who now works in [place] for [employer]; and daughter, Kaley, age 21, who enjoys [employer], currently a oumou at [employer] in [place].  Stefano is a native of the [place] area who attended [employer], where he studied economics, before attending graduate school at [employer].  He moved to the [facility] in 2018 after spending time  "in [facility].  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  Away from work, he enjoys golf and other exercise.  Typically he exercises by working out with hand weights or on an elliptical device for 45 minutes, 2-3 times per week; or engaging in \"warrior sculpt\" yoga for 60 minutes, 2-3 times per week.     Family History: Mother  in  at age 98; she was a retired nurse anesthetist with history of breast cancer, hypertension, vertigo, and dementia.  Father  at age 72 from AAA rupture, with no history of tobacco use or known medical problems.  A sister  at approximately age 50 from AIDS.  A sister 4 years his senior is a breast cancer survivor.  Younger daughter has congenital cataracts; older daughter is in good health.  Paternal grandparents  in their 80s.  Medical history of maternal grandparents is not known.     Review of Systems: Unchanged mild initial insomnia, managed with acetaminophen-diphenhydramine, which also helps treat allergic rhinitis symptoms.  Known snoring, without known gasping, observed apnea, or daytime somnolence.  Excessive throat clearing, without sputum production, most pronounced in the morning, and apparently somewhat worse after consumption of a smoothie containing banana, frozen fruit, almonds, and protein powder; no associated sinus congestion or pain, wheezing, dyspnea, nausea, sour taste, or abdominal pain.  Recurrent pattern of persistent non-productive cough following URI.  Mild dysphagia noted exclusively after use of tadalafil.  Persistent erectile dysfunction, improved with use of tadalafil; intact sexual drive.  Most recent colonoscopy was negative in 2019.  Most recent tetanus (Tdap) was administered in 2020.  Recombinant zoster vaccination series completed in 10/2020.  Covid (Pfizer) vaccinations administered on 2021, 2021, " "and 12/18/2021.  Remainder of complete review of systems was negative.     OBJECTIVE:     Vital signs: Height 73.75 inches.  Weight 202 pounds.  Blood pressure 128/82 on average of 3 automated readings.  Heart rate 68.  Respiratory rate 16.  O2 saturation 97% on room air.  General: Alert, neatly dressed and groomed, in no acute distress.  HEENT: Atraumatic and normocephalic. Eyelids, pupils, and conjunctivae appeared normal. Lips, teeth and gums appear normal.  Oropharynx showed moist mucous membranes, without exudate or erythema; somewhat \"crowded\" soft palate with mildly narrowed airway.  Neck: Supple, without thyromegaly, mass, or bruit. No cervical or supraclavicular lymphadenopathy.  Back: No spinal or costovertebral angle tenderness.  Chest: Clear to auscultation and percussion. Normal respiratory effort.  Cardiovascular: No jugular venous distention. Regular rate and rhythm, normal S1, S2 without murmur.  Abdomen: Bowel sounds positive; soft, nontender, without rebound, guarding, hepatosplenomegaly or mass.  Extremities: No cyanosis or edema.  Genitalia: Normal male genitalia, without scrotal mass or hernia. No inguinal lymphadenopathy.  Rectal: Normal tone, with smooth, nontender, non-enlarged prostate. No rectal mass.  Skin: Examination was deferred; full evaluation was completed earlier in day through dermatology clinic.  Neurologic: Cranial nerves II-XII were grossly intact. Sensory and motor examinations were normal. Normal gait.  Mini-cog score was 4/5 (5/5, with minimal prompting).  Psychiatric: Alert and oriented ×3. Normal affect. Judgment and insight intact.  SANJANA-7 score was 0.  PHQ-2 score was 0.     Creatinine 0.92, alkaline phosphatase 53, ALT 26, cholesterol 208, HDL 44, , triglycerides 118, cholesterol/HDL 4.7, PSA 1.09, TSH 1.28, 25-hydroxy vitamin D 34, glucose 98, white blood cell count 7000, hemoglobin 15.3, platelets 223,000, HIV screen nonreactive.  Urinalysis unremarkable.     EKG " "was unremarkable.  Spirometry showed an FEV1 of 3.74, with an FVC of 4.89; readings were 98% and 95% of predicted values, respectively.     Preliminary DEXA showed normal bone density, with most negative and valid T-score of -0.9 at the level of the right femoral neck.  Body composition analysis showed 26.0% fat (26th percentile); 7/2020 reading showed 23.1% fat; current body mass index 96.7; 7/2020 reading was 25.29.     ASSESSMENT:     1.  Excessive throat clearing.  Stefano and remy believe that his symptoms are most likely related to postnasal drainage.  While he denies sinus pain, purulent drainage, and fever, he does have a history of allergic rhinitis, albeit with different symptoms.  As an initial measure, he will increase cetirizine to 10 mg twice daily and follow symptoms; if no better after 3 days, he will add fluticasone nasal inhaler, 1 puff each nostril daily, allowing 7-10 days for full effect.  If symptoms persist, he will discontinue fluticasone, refer to once daily cetirizine, and begin omeprazole, 20 mg daily.  If throat clearing sensation does not improve after 2 weeks of omeprazole, treatment, he will pursue ENT consultation.  He will be advised to arrange further evaluation in the event of progressive symptoms such as fever, sinus pain, etc.     2.  Insomnia.  Mild initial insomnia, managed with diphenhydramine.  He was again advised to follow usual \"sleep hygiene\" recommendations, and to consider use of melatonin, 0.3-0.5 mg daily at bedtime.  Although he denies signs or symptoms of sleep apnea, we agreed that he would ask Dayana to monitor his breathing during sleep.  Based on his history of snoring in the setting of a relatively narrow airway, Stefano agrees to arrange sleep clinic evaluation if Dayana notes loud snoring, gasping, or pauses in breathing during sleep.     3.  Dyslipidemia.  Using current readings and guidelines from the AHA/ACC, his estimated 10-year risk of a vascular event " is 8.3% (optimal for his cohort is 5.8%).  I recommended with atorvastatin, 10 mg daily at bedtime.  Potential side effects were discussed. We reviewed the importance of non-pharmacologic measures, including maintenance of ideal weight, additional exercise, and adherence to a healthful diet, the elements of which were reviewed in detail.  He agrees to return for fasting cholesterol fractionation and ALT after 6 weeks.    4.  Family history of AAA.  In the setting of heightened risk for vascular disease, I recommended abdominal ultrasound to exclude AAA based on his father's history of ruptured AAA despite never having used tobacco.     5.  Erectile dysfunction.  Suboptimal response to tadalafil.  I will attempt to add prolactin and testosterone levels to previously collected laboratory specimen.  With history of suboptimal response to sildenafil, we will begin vardenafil, 20 mg daily as needed and monitor response.    6.  Preventive care.  Immunization status is up-to-date.  Colonoscopy was completed in 2019 and will be due in 2024.  He was congratulated for his regimen of regular exercise, but advised to introduce more rigorous aerobic exercise.  I recommended daily use of a 50 mcg vitamin D3 supplement, while maintaining daily calcium intake of 1200 mg, preferably from dietary sources.  We reviewed the roles of various types of exercise in the elements of a healthful diet.    PLAN: See above.     ~SRT  Answers for HPI/ROS submitted by the patient on 3/17/2022  SANJANA 7 TOTAL SCORE: 0  General Symptoms: No  Skin Symptoms: Yes  HENT Symptoms: No  EYE SYMPTOMS: No  HEART SYMPTOMS: No  LUNG SYMPTOMS: No  INTESTINAL SYMPTOMS: No  URINARY SYMPTOMS: No  REPRODUCTIVE SYMPTOMS: No  SKELETAL SYMPTOMS: No  BLOOD SYMPTOMS: No  NERVOUS SYSTEM SYMPTOMS: No  MENTAL HEALTH SYMPTOMS: No  Changes in hair: No  Changes in moles/birth marks: No  Itching: Yes  Rashes: No  Changes in nails: No  Acne: No  Change in facial hair: No  Warts:  No  Non-healing sores: No  Scarring: No  Flaking of skin: No  Color changes of hands/feet in cold : No  Sun sensitivity: No  Skin thickening: No          Again, thank you for allowing me to participate in the care of your patient.      Sincerely,    Kyle Floyd MD

## 2022-03-28 NOTE — LETTER
3/28/2022  RE: Stefano Merino  [address] [facility] [address]  [facility] [place] [address]   History and Physical Examination     SUBJECTIVE: Chief complaint: preventive health review.     Past Medical History:  1.  Allergic rhinitis, typically noted in hope  2.  History of adenomatous colonic polyps    3.  Dyslipidemia  4.  Status post excision of benign tumor, right pretibial region, circa 1990  5.  Status post bilateral LASIK procedures  6.  Erectile dysfunction  7.  Left atrial enlargement  8.  Family history of AAA rupture.  9.  Eczema     Adverse Drug Reactions: None.     Current Medications:  Vitamin D3, 1000 international units daily  Acetaminophen-diphenhydramine (Tylenol PM), nightly as needed  Tadalafil, 20 mg daily as needed, used approximately twice per week.  Cetirizine, 10 mg daily as needed  Fluocinonide, 0.05%, apply topically as needed  Daily multivitamin supplement (for men greater than 50 years)    Habits:  Tobacco: Never  Alcohol: 1-2 servings, one day per week  Caffeine: 3 servings of tea per week.  Street drugs: None     Social History:  (2011) and currently in a mutually monogamous relationship with Dayana, a female partner who works in real estate.  Father of 2 children from his 20-year marriage: daughter Kristina, 25, a [employer] graduate who was a competitive swimmer in high school who now works in [employer] for [employer]; and daughter, Kaley, age 21, who enjoys [employer], currently a oumou at [employer] in [place].  Stefano is a native of the [place] area who attended [employer], where he studied economics, before attending graduate school at [employer].  He moved to the [facility] in 2018 after spending time in [place] and [place].  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  Away from work, he enjoys golf and other exercise.  Typically he  "exercises by working out with hand weights or on an elliptical device for 45 minutes, 2-3 times per week; or engaging in \"warrior sculpt\" yoga for 60 minutes, 2-3 times per week.     Family History: Mother  in  at age 98; she was a retired nurse anesthetist with history of breast cancer, hypertension, vertigo, and dementia.  Father  at age 72 from AAA rupture, with no history of tobacco use or known medical problems.  A sister  at approximately age 50 from AIDS.  A sister 4 years his senior is a breast cancer survivor.  Younger daughter has congenital cataracts; older daughter is in good health.  Paternal grandparents  in their 80s.  Medical history of maternal grandparents is not known.     Review of Systems: Unchanged mild initial insomnia, managed with acetaminophen-diphenhydramine, which also helps treat allergic rhinitis symptoms.  Known snoring, without known gasping, observed apnea, or daytime somnolence.  Excessive throat clearing, without sputum production, most pronounced in the morning, and apparently somewhat worse after consumption of a smoothie containing banana, frozen fruit, almonds, and protein powder; no associated sinus congestion or pain, wheezing, dyspnea, nausea, sour taste, or abdominal pain.  Recurrent pattern of persistent non-productive cough following URI.  Mild dysphagia noted exclusively after use of tadalafil.  Persistent erectile dysfunction, improved with use of tadalafil; intact sexual drive.  Most recent colonoscopy was negative in 2019.  Most recent tetanus (Tdap) was administered in 2020.  Recombinant zoster vaccination series completed in 10/2020.  Covid (Pfizer) vaccinations administered on 2021, 2021, and 2021.  Remainder of complete review of systems was negative.     OBJECTIVE:   Vital signs: Height 73.75 inches.  Weight 202 pounds.  Blood pressure 128/82 on average of 3 automated readings.  Heart rate 68.  Respiratory rate 16.  O2 " "saturation 97% on room air.  General: Alert, neatly dressed and groomed, in no acute distress.  HEENT: Atraumatic and normocephalic. Eyelids, pupils, and conjunctivae appeared normal. Lips, teeth and gums appear normal.  Oropharynx showed moist mucous membranes, without exudate or erythema; somewhat \"crowded\" soft palate with mildly narrowed airway.  Neck: Supple, without thyromegaly, mass, or bruit. No cervical or supraclavicular lymphadenopathy.  Back: No spinal or costovertebral angle tenderness.  Chest: Clear to auscultation and percussion. Normal respiratory effort.  Cardiovascular: No jugular venous distention. Regular rate and rhythm, normal S1, S2 without murmur.  Abdomen: Bowel sounds positive; soft, nontender, without rebound, guarding, hepatosplenomegaly or mass.  Extremities: No cyanosis or edema.  Genitalia: Normal male genitalia, without scrotal mass or hernia. No inguinal lymphadenopathy.  Rectal: Normal tone, with smooth, nontender, non-enlarged prostate. No rectal mass.  Skin: Examination was deferred; full evaluation was completed earlier in day through dermatology clinic.  Neurologic: Cranial nerves II-XII were grossly intact. Sensory and motor examinations were normal. Normal gait.  Mini-cog score was 4/5 (5/5, with minimal prompting).  Psychiatric: Alert and oriented ×3. Normal affect. Judgment and insight intact.  SANJANA-7 score was 0.  PHQ-2 score was 0.     Creatinine 0.92, alkaline phosphatase 53, ALT 26, cholesterol 208, HDL 44, , triglycerides 118, cholesterol/HDL 4.7, PSA 1.09, TSH 1.28, 25-hydroxy vitamin D 34, glucose 98, white blood cell count 7000, hemoglobin 15.3, platelets 223,000, HIV screen nonreactive.  Urinalysis unremarkable.     EKG was unremarkable.  Spirometry showed an FEV1 of 3.74, with an FVC of 4.89; readings were 98% and 95% of predicted values, respectively.     Preliminary DEXA showed normal bone density, with most negative and valid T-score of -0.9 at the level " "of the right femoral neck.  Body composition analysis showed 26.0% fat (26th percentile); 7/2020 reading showed 23.1% fat; current body mass index 96.7; 7/2020 reading was 25.29.     ASSESSMENT:   1.  Excessive throat clearing.  Stefano and remy believe that his symptoms are most likely related to postnasal drainage.  While he denies sinus pain, purulent drainage, and fever, he does have a history of allergic rhinitis, albeit with different symptoms.  As an initial measure, he will increase cetirizine to 10 mg twice daily and follow symptoms; if no better after 3 days, he will add fluticasone nasal inhaler, 1 puff each nostril daily, allowing 7-10 days for full effect.  If symptoms persist, he will discontinue fluticasone, refer to once daily cetirizine, and begin omeprazole, 20 mg daily.  If throat clearing sensation does not improve after 2 weeks of omeprazole, treatment, he will pursue ENT consultation.  He will be advised to arrange further evaluation in the event of progressive symptoms such as fever, sinus pain, etc.     2.  Insomnia.  Mild initial insomnia, managed with diphenhydramine.  He was again advised to follow usual \"sleep hygiene\" recommendations, and to consider use of melatonin, 0.3-0.5 mg daily at bedtime.  Although he denies signs or symptoms of sleep apnea, we agreed that he would ask Dayana to monitor his breathing during sleep.  Based on his history of snoring in the setting of a relatively narrow airway, Stefano agrees to arrange sleep clinic evaluation if Dayana notes loud snoring, gasping, or pauses in breathing during sleep.     3.  Dyslipidemia.  Using current readings and guidelines from the AHA/ACC, his estimated 10-year risk of a vascular event is 8.3% (optimal for his cohort is 5.8%).  I recommended with atorvastatin, 10 mg daily at bedtime.  Potential side effects were discussed. We reviewed the importance of non-pharmacologic measures, including maintenance of ideal weight, " additional exercise, and adherence to a healthful diet, the elements of which were reviewed in detail.  He agrees to return for fasting cholesterol fractionation and ALT after 6 weeks.    4.  Family history of AAA.  In the setting of heightened risk for vascular disease, I recommended abdominal ultrasound to exclude AAA based on his father's history of ruptured AAA despite never having used tobacco.     5.  Erectile dysfunction.  Suboptimal response to tadalafil.  I will attempt to add prolactin and testosterone levels to previously collected laboratory specimen.  With history of suboptimal response to sildenafil, we will begin vardenafil, 20 mg daily as needed and monitor response.    6.  Preventive care.  Immunization status is up-to-date.  Colonoscopy was completed in 2019 and will be due in 2024.  He was congratulated for his regimen of regular exercise, but advised to introduce more rigorous aerobic exercise.  I recommended daily use of a 50 mcg vitamin D3 supplement, while maintaining daily calcium intake of 1200 mg, preferably from dietary sources.  We reviewed the roles of various types of exercise in the elements of a healthful diet.    PLAN: See above.  ~SRT  Answers for HPI/ROS submitted by the patient on 3/17/2022  SANJANA 7 TOTAL SCORE: 0  General Symptoms: No  Skin Symptoms: Yes  HENT Symptoms: No  EYE SYMPTOMS: No  HEART SYMPTOMS: No  LUNG SYMPTOMS: No  INTESTINAL SYMPTOMS: No  URINARY SYMPTOMS: No  REPRODUCTIVE SYMPTOMS: No  SKELETAL SYMPTOMS: No  BLOOD SYMPTOMS: No  NERVOUS SYSTEM SYMPTOMS: No  MENTAL HEALTH SYMPTOMS: No  Changes in hair: No  Changes in moles/birth marks: No  Itching: Yes  Rashes: No  Changes in nails: No  Acne: No  Change in facial hair: No  Warts: No  Non-healing sores: No  Scarring: No  Flaking of skin: No  Color changes of hands/feet in cold : No  Sun sensitivity: No  Skin thickening: No      Kyle Floyd MD

## 2022-03-28 NOTE — PROGRESS NOTES
Stefano Merino comes into clinic today at the request of Dr. ANDREW Floyd Ordering Provider for EKG.    This service provided today was under the supervising provider of the day Dr. ANDREW Floyd, who was available if needed.    Digna Trevizo, [facility]

## 2022-03-28 NOTE — NURSING NOTE
Dermatology Rooming Note    Stefano Merino's goals for this visit include:   Chief Complaint   Patient presents with     Skin Check     recently diagnosed eczema on elbows      Paula Tellez, Visit Facilitator

## 2022-03-29 LAB
ATRIAL RATE - MUSE: 68 BPM
DIASTOLIC BLOOD PRESSURE - MUSE: NORMAL MMHG
EXPTIME-PRE: 7.47 SEC
FEF2575-%PRED-PRE: 112 %
FEF2575-PRE: 3.56 L/SEC
FEF2575-PRED: 3.16 L/SEC
FEFMAX-%PRED-PRE: 111 %
FEFMAX-PRE: 11.34 L/SEC
FEFMAX-PRED: 10.16 L/SEC
FEV1-%PRED-PRE: 98 %
FEV1-PRE: 3.74 L
FEV1FEV6-PRE: 81 %
FEV1FEV6-PRED: 79 %
FEV1FVC-PRE: 80 %
FEV1FVC-PRED: 78 %
FIFMAX-PRE: 7.49 L/SEC
FVC-%PRED-PRE: 95 %
FVC-PRE: 4.69 L
FVC-PRED: 4.89 L
INTERPRETATION ECG - MUSE: NORMAL
P AXIS - MUSE: 77 DEGREES
PR INTERVAL - MUSE: 130 MS
QRS DURATION - MUSE: 86 MS
QT - MUSE: 400 MS
QTC - MUSE: 425 MS
R AXIS - MUSE: 16 DEGREES
SYSTOLIC BLOOD PRESSURE - MUSE: NORMAL MMHG
T AXIS - MUSE: 28 DEGREES
VENTRICULAR RATE- MUSE: 68 BPM

## 2022-03-30 LAB — TESTOST SERPL-MCNC: 431 NG/DL (ref 240–950)

## 2022-05-10 ENCOUNTER — ANCILLARY PROCEDURE (OUTPATIENT)
Dept: ULTRASOUND IMAGING | Facility: CLINIC | Age: 62
End: 2022-05-10
Attending: INTERNAL MEDICINE
Payer: COMMERCIAL

## 2022-05-10 DIAGNOSIS — E78.5 DYSLIPIDEMIA: ICD-10-CM

## 2022-05-10 PROCEDURE — 76775 US EXAM ABDO BACK WALL LIM: CPT | Mod: GC | Performed by: RADIOLOGY

## 2022-06-09 ENCOUNTER — TRANSFERRED RECORDS (OUTPATIENT)
Dept: INTERNAL MEDICINE | Facility: CLINIC | Age: 62
End: 2022-06-09

## 2022-06-16 ENCOUNTER — TELEPHONE (OUTPATIENT)
Dept: INTERNAL MEDICINE | Facility: CLINIC | Age: 62
End: 2022-06-16
Payer: COMMERCIAL

## 2022-06-16 DIAGNOSIS — U07.1 INFECTION DUE TO 2019 NOVEL CORONAVIRUS: Primary | ICD-10-CM

## 2022-06-16 NOTE — TELEPHONE ENCOUNTER
Stefano called to request treatment for Covid infection.  He indicates that he visited his daughter in [facility] this weekend, returning on Sunday, 6/12/2022; the following morning, he noted the onset of chills, cough, sneezing, and slight myalgias.  He worked that day and the following day, but by yesterday, felt poorly enough that he canceled a meeting to play golf.  This morning he tested positive for Covid.  He reports that using an Apple watch, his oxygen saturation was 93% this morning.  He reports that all above symptoms except cough had resolved and he denies dyspnea, sputum production, and lightheadedness.  He has used a cough syrup with codeine twice daily after noting little improvement with trials of NyQuil and DayQuil.    During our interview, Stefano spoken in complete sentences, without audible wheezing or cough.  We discussed options for management, indications for in-person evaluation, and risk factors for severe disease.    We agreed that Stefano would purchase a pulse oximeter today and pursue urgent in-person evaluation if O2 saturation is <94%.  If O2 saturation is 94% or higher, he will begin treatment with nirmatrelvir and ritonavir (Paxlovid) 2/1 tablets twice daily for 5 days.  We discussed the importance of pursuing prompt evaluation in the event of increasing dyspnea, falling O2 saturation, or other suggestion of progression.

## 2022-06-16 NOTE — PROGRESS NOTES
During phone conversation discussing plans treatment, Stefano was advised to hold cetirizine, tadalafil, vardenafil, and atorvastatin during and for 24 hours after completing treatment with nirmatrelvir and ritonavir due to potential interactions and adverse effects.

## 2022-09-18 ENCOUNTER — HEALTH MAINTENANCE LETTER (OUTPATIENT)
Age: 62
End: 2022-09-18

## 2022-10-04 ENCOUNTER — PATIENT OUTREACH (OUTPATIENT)
Dept: DERMATOLOGY | Facility: CLINIC | Age: 62
End: 2022-10-04

## 2022-10-04 NOTE — TELEPHONE ENCOUNTER
Attempted to reach patient to schedule follow up in the Dermatology Clinic.  No answer,  LM on VM to call office and [employer] message sent..    Schedule with Laurel Vazquez PA-C 3/2023.

## 2023-03-10 DIAGNOSIS — N52.9 ERECTILE DYSFUNCTION, UNSPECIFIED ERECTILE DYSFUNCTION TYPE: Primary | ICD-10-CM

## 2023-03-14 ENCOUNTER — TELEPHONE (OUTPATIENT)
Dept: FAMILY MEDICINE | Facility: CLINIC | Age: 63
End: 2023-03-14
Payer: COMMERCIAL

## 2023-03-14 NOTE — TELEPHONE ENCOUNTER
PT has referral in chart for sexual dysfunction, is interested in seeing Elise. PT's contact info and insurance have been verified - ok to leave vm.     PT has been informed that [facility] will reach out to complete intake

## 2023-03-17 ENCOUNTER — TELEPHONE (OUTPATIENT)
Dept: FAMILY MEDICINE | Facility: CLINIC | Age: 63
End: 2023-03-17
Payer: COMMERCIAL

## 2023-03-17 NOTE — TELEPHONE ENCOUNTER
Telephone Intake--REST  Date: 3/17/2023  Client Name: Stefano    MRN: [id number]  : 1960        Age: 62      Presenting Problem / Reason for Assessment   (Clinical History &Symptoms):     Firm erections -   more recently premature ejaculation      Length of time experiencing symptoms: 3 yrs      Seen Other Providers for Gender (if so, where):    M.D:  Kyle Floyd MD  --If yes, request records from the provider at the 1st session.    Therapist: NO  --if yes, request a referral or summary letter from the therapist at the 1st session..    Psychiatrist:  NO  --if yes,, request records from the provider at the 1st session..      Other Medical/Mental Health Diagnoses:  NO          If needed, clarify if patient calling from group home or other supervised living arrangement    Note if patient has no mental health or cognitive diagnosis, but phone behavior suggests impairment      Medications:  Viagra, Cialis, Lipitor, Zyrtec, Flonase, Levitra        Primary Care Provider: Kyle Floyd MD  --If multiple medical conditions, request recent records from primary doctor at the 1st session..      Referral Source:        Follow Up:        Please Verify Registration    If patient has [employer] or [employer], send to .     Prep Welcome Packet and have patient come half hour beforehand to fill out forms in packet (health history form, transgender history, Safety Screening, PHQ9, and SANJANA-7.

## 2023-05-04 ENCOUNTER — VIRTUAL VISIT (OUTPATIENT)
Dept: INTERNAL MEDICINE | Facility: CLINIC | Age: 63
End: 2023-05-04
Payer: COMMERCIAL

## 2023-05-04 DIAGNOSIS — Z00.00 ENCOUNTER FOR PREVENTATIVE ADULT HEALTH CARE EXAMINATION: Primary | ICD-10-CM

## 2023-05-04 PROCEDURE — 99207 PR NO CHARGE LOS: CPT

## 2023-05-04 NOTE — PROGRESS NOTES
Health Maintenance:  Do you have a PCP? No  When was your last visit with your PCP?   When was your last eye exam? 3/28/22  Have you ever had a colonoscopy? Yes   If yes, when? 2018  Have you ever had any polyps removed? Yes/No 1st 10 years ago: Yes, 5 years ago: No    As part of your visit we will set up a DEXA scan which will measure your body composition. We have a few questions that need to be answered before we can schedule this scan:   What is your approximate weight? 190   Have you ever had a DEXA scan within the past 2 years? No   Will you have any other imaging studies with contrast (x-ray, CT scan) within 7 days of this appointment? No   Have you had any spine or hip surgery? No   Do you take any vitamins that contain calcium or antacids with calcium? Yes    If yes, stop taking 24 hours prior to visit.     Goals for the Visit:  1. Thorough Comprehensive Preventive Exam  2. Hand Shaking  3. ED  Pertinent past Medical/Family and Social HX:   Pertinent sx that desire are addressed with this visit:     Instructions prior to appointment:   1. Fast beginning at 10 pm for lab appointment  2. If your preventive care assessment package includes a Fitness Assessment, please bring athletic shoes. Complementary [facility] [employer] [facility] fitness attire is provided and yours to keep.  3. If eye exam, eyes may be dilated, it will last 4-6 hours, may want to bring sunglasses.   4. May bring laptop or other work materials for use during downtime.   5. You will receive an email about 3 days prior to your visit with a final itinerary, menu selections for the complementary breakfast and lunch and instructions for the visit.     Complimentary  Parking provided. Drop off car in front of [facility], take the patient elevators to the [facility] clinic. When you enter in the lobby, identify yourself as an [facility] [atient and you will be escorted up to the clinic.   If questions  arise prior to your appointment please contact the clinic at [phone].

## 2023-05-07 ENCOUNTER — HEALTH MAINTENANCE LETTER (OUTPATIENT)
Age: 63
End: 2023-05-07

## 2023-05-08 NOTE — PROGRESS NOTES
[facility] Dermatology Note  Encounter Date: May 9, 2023  Office Visit     Dermatology Problem List:  1. 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 Enjoys golfing.  ____________________________________________    Assessment & Plan:    # Eczematous dermatitis - chronic, active - describes pimples on hands, notices only in summer.  S/p bx 9/18/20 with spongiotic dermatitis with eosinophils. Consider photoallergy v PMLE (path doesn't fit) v dyshidrotic eczema v nummular derm. Reassuringly does respond to topical steroids.  - Continue fluocinonide prn for flares  - Regular moisturization recommended  - If has big flare this summer, send message to eval in person and ok to add on, consider repeat bx versus patch testing    # Frictional dermatitis, elbows.  Ddx eczematous derm as above.  - Trial amlactin BID    # Onychorrhexis with subtle possible longitudinal melanonychia versus erythronychia, left 1st nail.  - Photo today to monitor  - Of note, in the photo there is a dark black spot on the tip of the left 1st nail which was ink     # Subungual hemorrhage, several toenails.  - Monitor for resolution     # Cherry angiomas  # Seborrheic keratoses  Discussed the natural history and benign nature of this lesion. Reassurance provided that no additional treatment is necessary.       # Multiple benign nevi  - No concerning lesions today  - Monitor for ABCDEs of melanoma   - Continue sun protection - recommend SPF 30 or higher with frequent application   - Return sooner if noticing changing or symptomatic lesions      Procedures Performed:   None    Follow-up: 1 year, sooner if concerns. Notify me for flares of rashes and if any pain/symptoms related to left 1st nail.    Staff and Scribe:     Scribe Disclosure:  DEB TURCIOS, am serving as a  scribe to document services personally performed by Zabrina Bear MD based on data collection and the provider's statements to me.     Provider Disclosure:   The documentation recorded by the scribe accurately reflects the services I personally performed and the decisions made by me.    Zabrina Bear MD    Department of Dermatology  [facility]: Phone: [phone], Fax: [phone]  5/9/2023     ____________________________________________    CC: Skin Check (JUAQUIN Agarwal has no know areas of concern )    HPI:  Mr. Stefano Merino is a(n) 62 year old male who presents today as a return patient for dermatitis. Last seen by Laurel Vazquez PA-C on 3/28/22, at which time no changes were made to patient care.    Still has rash on elbows, also gets little pimples on hands and forearms.  Golfing/wearing glove may exacerbate.  TMC and fluocinonide in the past have helped, not currently using.  No eczema in youth.  Does a lot of hot yoga.  Uses medicated body wash at home, different products on the road when traveling, no real difference in skin flares at home v traveling.  Tries to use better lotions.  Somewhat itchy.  It didn't happen in the summer.    Patient is otherwise feeling well, without additional skin concerns.    Labs Reviewed:  N/A    Physical Exam:  Vitals: There were no vitals taken for this visit.  SKIN: Total skin excluding the undergarment areas was performed. The exam included the head/face, neck, both arms, chest, back, abdomen, both legs, digits and/or nails.   - subtle flesh colored papule on right dorsal hand  - hyperpigmented thin plaques with overlying fine scale bilateral elbows  - multiple fingernails with longitudinal ridging, ?possible longitudinal melanonychia, no lesion at proximal nail fold  - several toenails with subungal hemorrhage and purple black red blotches  - There are dome shaped  bright red papules on the trunk and extremities .   - Multiple regular brown pigmented macules and papules are identified on the trunk and extremities.   - Scattered brown macules on sun exposed areas.  - Waxy stuck on papules and plaques on trunk and extremities.   - No other lesions of concern on areas examined.     Medications:  Current Outpatient Medications   Medication     atorvastatin (LIPITOR) 10 MG tablet     cetirizine (ZYRTEC) 10 MG tablet     Cholecalciferol (VITAMIN D3) 25 MCG (1000 UT) CAPS     doxylamine (UNISOM) 25 MG TABS tablet     fluocinonide (LIDEX) 0.05 % external cream     Multiple Vitamin (MULTIVITAMIN ADULT PO)     tadalafil (CIALIS) 20 MG tablet     triamcinolone (KENALOG) 0.025 % external ointment     vardenafil (LEVITRA) 20 MG tablet     No current facility-administered medications for this visit.      Past Medical History:   Patient Active Problem List   Diagnosis     Erectile dysfunction of organic origin     Allergic rhinitis     Dyslipidemia     Past Medical History:   Diagnosis Date     Colon polyps      Hyperlipidemia over fiftenn years    HAve not had to medicate     Seasonal allergies         CC No referring provider defined for this encounter. on close of this encounter.

## 2023-05-09 ENCOUNTER — OFFICE VISIT (OUTPATIENT)
Dept: INTERNAL MEDICINE | Facility: CLINIC | Age: 63
End: 2023-05-09
Payer: COMMERCIAL

## 2023-05-09 ENCOUNTER — APPOINTMENT (OUTPATIENT)
Dept: INTERNAL MEDICINE | Facility: CLINIC | Age: 63
End: 2023-05-09
Payer: COMMERCIAL

## 2023-05-09 ENCOUNTER — ANCILLARY PROCEDURE (OUTPATIENT)
Dept: BONE DENSITY | Facility: CLINIC | Age: 63
End: 2023-05-09
Payer: COMMERCIAL

## 2023-05-09 ENCOUNTER — OFFICE VISIT (OUTPATIENT)
Dept: DERMATOLOGY | Facility: CLINIC | Age: 63
End: 2023-05-09
Payer: COMMERCIAL

## 2023-05-09 ENCOUNTER — OFFICE VISIT (OUTPATIENT)
Dept: AUDIOLOGY | Facility: CLINIC | Age: 63
End: 2023-05-09
Attending: INTERNAL MEDICINE
Payer: COMMERCIAL

## 2023-05-09 ENCOUNTER — OFFICE VISIT (OUTPATIENT)
Dept: OPHTHALMOLOGY | Facility: CLINIC | Age: 63
End: 2023-05-09
Payer: COMMERCIAL

## 2023-05-09 VITALS
BODY MASS INDEX: 24 KG/M2 | OXYGEN SATURATION: 97 % | TEMPERATURE: 98.1 F | WEIGHT: 187 LBS | DIASTOLIC BLOOD PRESSURE: 72 MMHG | HEIGHT: 74 IN | HEART RATE: 74 BPM | SYSTOLIC BLOOD PRESSURE: 109 MMHG | RESPIRATION RATE: 16 BRPM

## 2023-05-09 VITALS — BODY MASS INDEX: 24.13 KG/M2 | WEIGHT: 188 LBS | HEIGHT: 74 IN

## 2023-05-09 DIAGNOSIS — Z01.10 NORMAL HEARING EXAM: Primary | ICD-10-CM

## 2023-05-09 DIAGNOSIS — R73.01 IMPAIRED FASTING GLUCOSE: ICD-10-CM

## 2023-05-09 DIAGNOSIS — R09.89 THROAT CLEARING: ICD-10-CM

## 2023-05-09 DIAGNOSIS — H25.813 MIXED TYPE AGE-RELATED CATARACT, BOTH EYES: ICD-10-CM

## 2023-05-09 DIAGNOSIS — Z00.00 ENCOUNTER FOR PREVENTATIVE ADULT HEALTH CARE EXAMINATION: ICD-10-CM

## 2023-05-09 DIAGNOSIS — Z00.00 ENCOUNTER FOR PREVENTATIVE ADULT HEALTH CARE EXAMINATION: Primary | ICD-10-CM

## 2023-05-09 DIAGNOSIS — L30.9 DERMATITIS: Primary | ICD-10-CM

## 2023-05-09 DIAGNOSIS — D72.829 LEUKOCYTOSIS, UNSPECIFIED TYPE: ICD-10-CM

## 2023-05-09 DIAGNOSIS — H52.13 MYOPIA, BILATERAL: Primary | ICD-10-CM

## 2023-05-09 DIAGNOSIS — R10.31 GROIN PAIN, RIGHT: ICD-10-CM

## 2023-05-09 DIAGNOSIS — E78.5 DYSLIPIDEMIA: ICD-10-CM

## 2023-05-09 DIAGNOSIS — Z98.890 HX OF LASIK: ICD-10-CM

## 2023-05-09 DIAGNOSIS — R82.90 ABNORMAL URINALYSIS: ICD-10-CM

## 2023-05-09 DIAGNOSIS — R25.1 TREMOR: ICD-10-CM

## 2023-05-09 DIAGNOSIS — G47.00 INSOMNIA, UNSPECIFIED TYPE: ICD-10-CM

## 2023-05-09 DIAGNOSIS — H52.4 PRESBYOPIA OF BOTH EYES: ICD-10-CM

## 2023-05-09 LAB
ALBUMIN UR-MCNC: 20 MG/DL
ALP SERPL-CCNC: 58 U/L (ref 40–129)
ALT SERPL W P-5'-P-CCNC: 21 U/L (ref 10–50)
APPEARANCE UR: CLEAR
BASOPHILS # BLD AUTO: 0 10E3/UL (ref 0–0.2)
BASOPHILS NFR BLD AUTO: 0 %
BILIRUB UR QL STRIP: NEGATIVE
CHOLEST SERPL-MCNC: 198 MG/DL
COLOR UR AUTO: YELLOW
CREAT SERPL-MCNC: 1.11 MG/DL (ref 0.67–1.17)
DEPRECATED CALCIDIOL+CALCIFEROL SERPL-MC: 61 UG/L (ref 20–75)
EOSINOPHIL # BLD AUTO: 0.2 10E3/UL (ref 0–0.7)
EOSINOPHIL NFR BLD AUTO: 1 %
ERYTHROCYTE [DISTWIDTH] IN BLOOD BY AUTOMATED COUNT: 12.2 % (ref 10–15)
FASTING STATUS PATIENT QL REPORTED: ABNORMAL
GFR SERPL CREATININE-BSD FRML MDRD: 76 ML/MIN/1.73M2
GLUCOSE SERPL-MCNC: 105 MG/DL (ref 70–99)
GLUCOSE UR STRIP-MCNC: NEGATIVE MG/DL
HBV SURFACE AB SERPL IA-ACNC: 0.14 M[IU]/ML
HBV SURFACE AB SERPL IA-ACNC: NONREACTIVE M[IU]/ML
HCT VFR BLD AUTO: 47.7 % (ref 40–53)
HDLC SERPL-MCNC: 61 MG/DL
HGB BLD-MCNC: 15.7 G/DL (ref 13.3–17.7)
HGB UR QL STRIP: ABNORMAL
HIV 1+2 AB+HIV1 P24 AG SERPL QL IA: NONREACTIVE
HOLD SPECIMEN: NORMAL
HOLD SPECIMEN: NORMAL
IMM GRANULOCYTES # BLD: 0 10E3/UL
IMM GRANULOCYTES NFR BLD: 0 %
KETONES UR STRIP-MCNC: ABNORMAL MG/DL
LDLC SERPL CALC-MCNC: 117 MG/DL
LEUKOCYTE ESTERASE UR QL STRIP: NEGATIVE
LYMPHOCYTES # BLD AUTO: 0.9 10E3/UL (ref 0.8–5.3)
LYMPHOCYTES NFR BLD AUTO: 7 %
MCH RBC QN AUTO: 30.1 PG (ref 26.5–33)
MCHC RBC AUTO-ENTMCNC: 32.9 G/DL (ref 31.5–36.5)
MCV RBC AUTO: 92 FL (ref 78–100)
MONOCYTES # BLD AUTO: 1.1 10E3/UL (ref 0–1.3)
MONOCYTES NFR BLD AUTO: 8 %
MUCOUS THREADS #/AREA URNS LPF: PRESENT /LPF
NEUTROPHILS # BLD AUTO: 10.5 10E3/UL (ref 1.6–8.3)
NEUTROPHILS NFR BLD AUTO: 84 %
NITRATE UR QL: NEGATIVE
NONHDLC SERPL-MCNC: 137 MG/DL
NRBC # BLD AUTO: 0 10E3/UL
NRBC BLD AUTO-RTO: 0 /100
PH UR STRIP: 6 [PH] (ref 5–7)
PLATELET # BLD AUTO: 198 10E3/UL (ref 150–450)
PSA SERPL DL<=0.01 NG/ML-MCNC: 0.95 NG/ML (ref 0–4.5)
RBC # BLD AUTO: 5.21 10E6/UL (ref 4.4–5.9)
RBC URINE: 9 /HPF
SP GR UR STRIP: 1.03 (ref 1–1.03)
TRIGL SERPL-MCNC: 100 MG/DL
TSH SERPL DL<=0.005 MIU/L-ACNC: 1.37 UIU/ML (ref 0.3–4.2)
UROBILINOGEN UR STRIP-MCNC: NORMAL MG/DL
WBC # BLD AUTO: 12.7 10E3/UL (ref 4–11)
WBC URINE: <1 /HPF

## 2023-05-09 PROCEDURE — 99000 SPECIMEN HANDLING OFFICE-LAB: CPT | Performed by: PATHOLOGY

## 2023-05-09 PROCEDURE — 77080 DXA BONE DENSITY AXIAL: CPT | Performed by: INTERNAL MEDICINE

## 2023-05-09 PROCEDURE — 85025 COMPLETE CBC W/AUTO DIFF WBC: CPT | Performed by: PATHOLOGY

## 2023-05-09 PROCEDURE — 81001 URINALYSIS AUTO W/SCOPE: CPT | Performed by: PATHOLOGY

## 2023-05-09 PROCEDURE — 86706 HEP B SURFACE ANTIBODY: CPT | Performed by: PATHOLOGY

## 2023-05-09 PROCEDURE — 82565 ASSAY OF CREATININE: CPT | Performed by: PATHOLOGY

## 2023-05-09 PROCEDURE — 84443 ASSAY THYROID STIM HORMONE: CPT | Performed by: PATHOLOGY

## 2023-05-09 PROCEDURE — 99207 PR NO CHARGE LOS: CPT

## 2023-05-09 PROCEDURE — 94375 RESPIRATORY FLOW VOLUME LOOP: CPT | Performed by: INTERNAL MEDICINE

## 2023-05-09 PROCEDURE — 99214 OFFICE O/P EST MOD 30 MIN: CPT | Performed by: DERMATOLOGY

## 2023-05-09 PROCEDURE — 84460 ALANINE AMINO (ALT) (SGPT): CPT | Performed by: PATHOLOGY

## 2023-05-09 PROCEDURE — 93010 ELECTROCARDIOGRAM REPORT: CPT | Performed by: INTERNAL MEDICINE

## 2023-05-09 PROCEDURE — 80061 LIPID PANEL: CPT | Performed by: PATHOLOGY

## 2023-05-09 PROCEDURE — 92014 COMPRE OPH EXAM EST PT 1/>: CPT | Performed by: OPHTHALMOLOGY

## 2023-05-09 PROCEDURE — 82947 ASSAY GLUCOSE BLOOD QUANT: CPT | Performed by: PATHOLOGY

## 2023-05-09 PROCEDURE — 84075 ASSAY ALKALINE PHOSPHATASE: CPT | Performed by: PATHOLOGY

## 2023-05-09 PROCEDURE — 99396 PREV VISIT EST AGE 40-64: CPT | Performed by: INTERNAL MEDICINE

## 2023-05-09 PROCEDURE — G0103 PSA SCREENING: HCPCS | Performed by: PATHOLOGY

## 2023-05-09 PROCEDURE — 96999 UNLISTED SPEC DERM SVC/PX: CPT | Performed by: INTERNAL MEDICINE

## 2023-05-09 PROCEDURE — 92015 DETERMINE REFRACTIVE STATE: CPT | Performed by: OPHTHALMOLOGY

## 2023-05-09 PROCEDURE — 82306 VITAMIN D 25 HYDROXY: CPT | Performed by: PATHOLOGY

## 2023-05-09 PROCEDURE — 36415 COLL VENOUS BLD VENIPUNCTURE: CPT | Performed by: PATHOLOGY

## 2023-05-09 PROCEDURE — 87389 HIV-1 AG W/HIV-1&-2 AB AG IA: CPT | Performed by: PATHOLOGY

## 2023-05-09 PROCEDURE — 92553 AUDIOMETRY AIR & BONE: CPT | Performed by: AUDIOLOGIST

## 2023-05-09 RX ORDER — SILDENAFIL 100 MG/1
100 TABLET, FILM COATED ORAL DAILY PRN
COMMUNITY
Start: 2023-05-09

## 2023-05-09 ASSESSMENT — REFRACTION
OD_CYLINDER: +0.50
OS_SPHERE: -1.25
OD_ADD: +2.00
OS_ADD: +2.00
OS_CYLINDER: SPHERE
OD_AXIS: 160
OD_SPHERE: -1.50

## 2023-05-09 ASSESSMENT — REFRACTION_WEARINGRX
OS_CYLINDER: SPHERE
OS_AXIS: 152
OS_SPHERE: -1.50
OS_CYLINDER: +0.25
OD_CYLINDER: SPHERE
SPECS_TYPE: DISTANCE
SPECS_TYPE: SVL
OD_SPHERE: -1.00
OD_SPHERE: -0.75
OS_SPHERE: -0.25
OD_CYLINDER: SPHERE

## 2023-05-09 ASSESSMENT — CONF VISUAL FIELD
OS_NORMAL: 1
OS_INFERIOR_NASAL_RESTRICTION: 0
OD_INFERIOR_NASAL_RESTRICTION: 0
OD_SUPERIOR_TEMPORAL_RESTRICTION: 0
OD_INFERIOR_TEMPORAL_RESTRICTION: 0
OD_NORMAL: 1
OS_INFERIOR_TEMPORAL_RESTRICTION: 0
OS_SUPERIOR_TEMPORAL_RESTRICTION: 0
OD_SUPERIOR_NASAL_RESTRICTION: 0
OS_SUPERIOR_NASAL_RESTRICTION: 0

## 2023-05-09 ASSESSMENT — VISUAL ACUITY
METHOD: SNELLEN - LINEAR
OD_PH_CC: 20/25
OD_SC: J5
OS_CC: 20/25
OD_CC: 20/30
OS_PH_CC+: +2
CORRECTION_TYPE: GLASSES
OS_CC+: -1
OS_PH_CC: 20/25
OD_CC+: -2
OS_SC: J3-1

## 2023-05-09 ASSESSMENT — PAIN SCALES - GENERAL
PAINLEVEL: NO PAIN (0)
PAINLEVEL: NO PAIN (1)

## 2023-05-09 ASSESSMENT — REFRACTION_MANIFEST
OS_ADD: +2.00
OD_AXIS: 160
OS_CYLINDER: SPHERE
OD_CYLINDER: +0.50
OD_SPHERE: -1.75
OD_ADD: +2.00
OS_SPHERE: -1.50

## 2023-05-09 ASSESSMENT — SLIT LAMP EXAM - LIDS
COMMENTS: NORMAL
COMMENTS: NORMAL

## 2023-05-09 ASSESSMENT — EXTERNAL EXAM - LEFT EYE: OS_EXAM: NORMAL

## 2023-05-09 ASSESSMENT — TONOMETRY
OD_IOP_MMHG: 17
OS_IOP_MMHG: 17
IOP_METHOD: TONOPEN

## 2023-05-09 ASSESSMENT — CUP TO DISC RATIO
OS_RATIO: 0.2
OD_RATIO: 0.2

## 2023-05-09 ASSESSMENT — EXTERNAL EXAM - RIGHT EYE: OD_EXAM: NORMAL

## 2023-05-09 NOTE — NURSING NOTE
Chief Complaint   Patient presents with     Physical     Patient is here for annual physical     Dinga Trevizo CMA 7:26 AM on 5/9/2023.

## 2023-05-09 NOTE — NURSING NOTE
Dermatology Rooming Note    Stefano Merino's goals for this visit include:   Chief Complaint   Patient presents with     Skin Check     Stefano has no know areas of concern      Eda Esparza, Visit Facilitator

## 2023-05-09 NOTE — PROGRESS NOTES
AUDIOLOGY REPORT  [facility] protocol      SUMMARY: Audiology visit completed. See audiogram for results.       RECOMMENDATIONS: Recheck hearing if changes are noted or concerns arise.      Bella Swift. CCC-A  Licensed Audiologist   [place] #[id number]

## 2023-05-09 NOTE — PROGRESS NOTES
HPI  Stefano Merino is a 62 year old male here for comprehensive eye exam. Uses distance glasses and OTC readers or ok taking glasses off for most reading.   History of laser in situ keratomileusis (LASIK) both eyes, considering getting it again since now needing distance glasses so much.  Could never get used to contact lenses.  Denies eye pain or irritation. Does not take eye drops.     PMH:   Past Medical History:   Diagnosis Date     Colon polyps      Hyperlipidemia over fiftenn years    HAve not had to medicate     Seasonal allergies       POH: Glasses for myopia after laser in situ keratomileusis (LASIK) both eyes cataracts,  no surgery, no trauma  Oc Meds: none  FH: Denies any glaucoma, age related macular degeneration, or other known eye diseases other than daughter with congenital cataracts        Assessment & Plan     1. Myopia, bilateral - Both Eyes    2. Presbyopia of both eyes - Both Eyes    3. Hx of LASIK - Both Eyes    4. Mixed type age-related cataract, both eyes - Both Eyes      Myopia with presbyopia after remote (presumed myopic) laser in situ keratomileusis (LASIK).  Clear flap interface. Does have the start of moderate cataracts, so laser in situ keratomileusis (LASIK) touchup would not be likely to be helpful for more than 10 years.  Reviewed that mild uncorrected myopia is helping with near vision, so laser in situ keratomileusis (LASIK) for distance would mean reading glasses would be necessary.    Also discussed monovision either with contact lenses (or contact lens trial to see if he tolerated) and then could consider laser in situ keratomileusis (LASIK).      Plan:  Manifest refraction done and prescription for glasses given   Observe cataract  Contact lens trial as needed with optometrist   Laser in situ keratomileusis (LASIK) consult if desired  -----------------------------------------------------------------------------------       Patient disposition:   Return in about 1 year (around  5/9/2024) for Comprehensive Exam. or patient to call sooner as needed.      Complete documentation of historical and exam elements from today's encounter can be found in the full encounter summary report (not reduplicated in this progress note). I personally obtained the chief complaint(s) and history of present illness.  I have confirmed and edited as necessary the CC, HPI, PMH/PSH, social history, FMH, ROS, and exam/neuro findings as obtained by the technician or others. I have examined this patient myself and I personally viewed the image(s) and studies listed above and the documentation reflects my findings and interpretation.  I formulated and edited as necessary the assessment and plan and discussed the findings and management plan with the patient and family.     Aleisha Key MD

## 2023-05-09 NOTE — NURSING NOTE
AHA BP    1st   109/72  2nd  115/74  3rd   109/72    Average  111/73  Digna Trevizo CMA 10:12 AM on 5/9/2023

## 2023-05-09 NOTE — LETTER
5/9/2023       RE: Stefano Merino  [address]  [place] [address]     Dear Colleague,    Thank you for referring your patient, Stefano Merino, to the [facility] [place] at [facility]. Please see a copy of my visit note below.    [facility] Dermatology Note  Encounter Date: May 9, 2023  Office Visit     Dermatology Problem List:  1. 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 Enjoys golfing.  ____________________________________________    Assessment & Plan:    # Eczematous dermatitis - chronic, active - describes pimples on hands, notices only in summer.  S/p bx 9/18/20 with spongiotic dermatitis with eosinophils. Consider photoallergy v PMLE (path doesn't fit) v dyshidrotic eczema v nummular derm. Reassuringly does respond to topical steroids.  - Continue fluocinonide prn for flares  - Regular moisturization recommended  - If has big flare this summer, send message to eval in person and ok to add on, consider repeat bx versus patch testing    # Frictional dermatitis, elbows.  Ddx eczematous derm as above.  - Trial amlactin BID    # Onychorrhexis with subtle possible longitudinal melanonychia versus erythronychia, left 1st nail.  - Photo today to monitor  - Of note, in the photo there is a dark black spot on the tip of the left 1st nail which was ink     # Subungual hemorrhage, several toenails.  - Monitor for resolution     # Cherry angiomas  # Seborrheic keratoses  Discussed the natural history and benign nature of this lesion. Reassurance provided that no additional treatment is necessary.       # Multiple benign nevi  - No concerning lesions today  - Monitor for ABCDEs of melanoma   - Continue sun protection - recommend SPF 30 or higher with  frequent application   - Return sooner if noticing changing or symptomatic lesions      Procedures Performed:   None    Follow-up: 1 year, sooner if concerns. Notify me for flares of rashes and if any pain/symptoms related to left 1st nail.    Staff and Scribe:     Scribe Disclosure:  I, DEB LOPEZ, am serving as a scribe to document services personally performed by Zabrina Bear MD based on data collection and the provider's statements to me.     Provider Disclosure:   The documentation recorded by the scribe accurately reflects the services I personally performed and the decisions made by me.    Zabrina Bear MD    Department of Dermatology  [facility]: Phone: [phone], Fax: [phone]  5/9/2023     ____________________________________________    CC: Skin Check (JUAQUIN Agarwal has no know areas of concern )    HPI:  Mr. Stefano Merino is a(n) 62 year old male who presents today as a return patient for dermatitis. Last seen by Laurel Vazquez PA-C on 3/28/22, at which time no changes were made to patient care.    Still has rash on elbows, also gets little pimples on hands and forearms.  Golfing/wearing glove may exacerbate.  TMC and fluocinonide in the past have helped, not currently using.  No eczema in youth.  Does a lot of hot yoga.  Uses medicated body wash at home, different products on the road when traveling, no real difference in skin flares at home v traveling.  Tries to use better lotions.  Somewhat itchy.  It didn't happen in the summer.    Patient is otherwise feeling well, without additional skin concerns.    Labs Reviewed:  N/A    Physical Exam:  Vitals: There were no vitals taken for this visit.  SKIN: Total skin excluding the undergarment areas was performed. The exam included the head/face, neck, both arms, chest, back, abdomen, both legs, digits and/or nails.   - subtle flesh colored papule  on right dorsal hand  - hyperpigmented thin plaques with overlying fine scale bilateral elbows  - multiple fingernails with longitudinal ridging, ?possible longitudinal melanonychia, no lesion at proximal nail fold  - several toenails with subungal hemorrhage and purple black red blotches  - There are dome shaped bright red papules on the trunk and extremities .   - Multiple regular brown pigmented macules and papules are identified on the trunk and extremities.   - Scattered brown macules on sun exposed areas.  - Waxy stuck on papules and plaques on trunk and extremities.   - No other lesions of concern on areas examined.     Medications:  Current Outpatient Medications   Medication    atorvastatin (LIPITOR) 10 MG tablet    cetirizine (ZYRTEC) 10 MG tablet    Cholecalciferol (VITAMIN D3) 25 MCG (1000 UT) CAPS    doxylamine (UNISOM) 25 MG TABS tablet    fluocinonide (LIDEX) 0.05 % external cream    Multiple Vitamin (MULTIVITAMIN ADULT PO)    tadalafil (CIALIS) 20 MG tablet    triamcinolone (KENALOG) 0.025 % external ointment    vardenafil (LEVITRA) 20 MG tablet     No current facility-administered medications for this visit.      Past Medical History:   Patient Active Problem List   Diagnosis    Erectile dysfunction of organic origin    Allergic rhinitis    Dyslipidemia     Past Medical History:   Diagnosis Date    Colon polyps     Hyperlipidemia over fiftenn years    HAve not had to medicate    Seasonal allergies         CC No referring provider defined for this encounter. on close of this encounter.

## 2023-05-09 NOTE — NURSING NOTE
Chief Complaints and History of Present Illnesses   Patient presents with     COMPREHENSIVE EYE EXAM     Comprehensive.      Chief Complaint(s) and History of Present Illness(es)     COMPREHENSIVE EYE EXAM            Laterality: both eyes    Associated symptoms: Negative for dryness, eye pain, tearing and discharge    Treatments tried: no treatments    Pain scale: 0/10    Comments: Comprehensive.           Comments    Feels glasses are working fine for distance. Take glasses off near and that is working fine and well. States bifocals at some point may be considered.   Has LASIK 23 years ago and questions if this might be an option to enhance possible for Monovision or what ever could be done.     Ana Samaniego, COT COT 8:07 AM May 9, 2023

## 2023-05-09 NOTE — LETTER
5/9/2023       RE: Stefano Merino  [address]  [place] [address]     Dear Colleague,    Thank you for referring your patient, Stefano Merino, to the [facility] at [facility]. Please see a copy of my visit note below.    History and Physical Examination     SUBJECTIVE: Chief complaint: preventive health review.     Past Medical History:  1.  Allergic rhinitis, typically noted in autumn  2.  History of adenomatous colonic polyps    3.  Dyslipidemia  4.  Status post excision of benign tumor, right pretibial region, circa 1990  5.  Status post bilateral LASIK   6.  Erectile dysfunction  7.  Left atrial enlargement  8.  Family history of AAA rupture; abdominal ultrasound negative for AAA in 5/2022  9.  Eczema     Adverse Drug Reactions: None.     Current Medications:  Vitamin D3, 25 mcg daily  Doxylamine succinate (Unisom), nightly as needed  Sildenafil, 100 mg daily as needed  Cetirizine, 10 mg daily as needed  Fluocinonide, 0.05%, apply topically as needed  Daily multivitamin supplement (for men over age 50),  L-arginine, 1000 mg daily     Habits:  Tobacco: Never  Alcohol: 2-3 servings, one day per week  Caffeine: Occasional serving of tea.  Street drugs: None     Social History: Stefano  in 2011 and ended an 8-year relationship with Dayana in September, 2022.  Father of 2 children from his 20-year marriage: daughter Kristina, 26, a [employer] graduate who was a competitive swimmer in high school and now works in [place] campus for [employer]; and daughter Kaley, age 22, who enjoys [employer], a recent graduate of [employer] in [place] who now works for a [place] investment firm.  Stefano is a native of the [place] area who attended [employer], where he studied economics, before attending graduate school at [employer].  He moved to the [place] in 2018 after working in  "[place] [place] [facility] [employer] [facility] [employer].  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  Away from work, he enjoys golf and other exercise, currently exercising most days of the week, alternating among pickle ball games that last up to 2.5 hours, playing golf, working out with hand weights, elliptical device workouts, and \"warrior sculpt\" yoga for 60 minutes.     Family History: Mother  in  at age 98; she was a retired nurse anesthetist with history of breast cancer, hypertension, vertigo, and dementia.  Father  at age 72 from AAA rupture, with no history of tobacco use or known medical problems.  A sister  at approximately age 50 from AIDS.  A sister 4 years his senior is a breast cancer survivor.  Younger daughter has congenital cataracts; older daughter has an unspecified gastrointestinal disorder.  Paternal grandparents  in their 80s.  Medical history of maternal grandparents is not known.     Review of Systems: Three days ago, Stefano noted chills and diminished appetite, without fever, cough, flank or abdominal pain, dysuria, or hematuria; the day prior to the onset of symptoms, he had played Confidex for 2.5 hours, then attended a 60-minute hot yoga session, following which he hit golf balls at a driving range.  Several-month history of medial right proximal thigh discomfort, noted with sudden position changes and stair climbing; no associated back discomfort or more distal symptoms.  Mild intermittent intention tremor, which does not interfere with her usual activities.  Stefano uses doxylamine succinate nightly to address insomnia; this also helps treat allergic rhinitis symptoms.  No significant problems with memory or cognition. Known intermittent mild snoring, without known gasping, observed apnea, or daytime somnolence.  He indicates that he falls into a deep sleep, awakens after 4 hours, then returns to sleep in a state " "he describes as less restful; he awakens in the morning feeling refreshed.  Persistent frequent throat clearing, with significant morning phlegm production, without heartburn, cough, dyspnea, or wheezing.  Recurrent pattern of persistent non-productive cough following URIs.  Currently sexually inactive; previously he noted that erectile dysfunction responded better to sildenafil when tadalafil.  Most recent colonoscopy was negative in 7/2019.  Most recent tetanus (Tdap) was administered in 7/2020.  Recombinant zoster vaccination series completed in 10/2020.  Covid (Pfizer) vaccinations administered on 4/14/2021, 5/7/2021, and 12/18/2021.  Remainder of complete review of systems was negative.     OBJECTIVE:     Vital signs: Height 73.75 inches.  Weight 187 pounds.  Blood pressure 111/73 on average of 3 automated readings.  Heart rate 74.  Respiratory rate 16.  Temperature 98.1 degrees.  O2 saturation 97% on room air.  General: Alert, neatly dressed and groomed, in no acute distress.  HEENT: Atraumatic and normocephalic. Eyelids, pupils, and conjunctivae appeared normal. Lips, teeth and gums appear normal.  Oropharynx showed moist mucous membranes, without exudate or erythema; somewhat \"crowded\" soft palate with mildly narrowed airway.  Neck: Supple, without thyromegaly, mass, or bruit. No cervical or supraclavicular lymphadenopathy.  Back: No spinal or costovertebral angle tenderness.  Chest: Clear to auscultation and percussion. Normal respiratory effort.  Cardiovascular: No jugular venous distention. Regular rate and rhythm, normal S1, S2 without murmur.  Abdomen: Bowel sounds positive; soft, nontender, without rebound, guarding, hepatosplenomegaly or mass.  Extremities: No cyanosis or edema.  Genitalia: Normal male genitalia, without scrotal mass or hernia. No inguinal lymphadenopathy.  Rectal: Normal tone, with smooth, nontender, non-enlarged prostate. No rectal mass.  Skin: Examination was deferred; full " evaluation was completed earlier in day through dermatology clinic.  Neurologic: Cranial nerves II-XII were grossly intact. Sensory and motor examinations were normal. Normal gait.  Fine intention tremor with no resting tremor.  No cogwheel rigidity.  Normal finger-nose-finger and heel-shin testing.  Mini-cog score was 3/5.  Psychiatric: Alert and oriented ×3. Normal affect. Judgment and insight intact.  PHQ-2 score was 0.     Creatinine 1.11, alkaline phosphatase 58, ALT 21, cholesterol 198, HDL 61, , triglycerides 100, cholesterol/HDL 3.2, PSA 0.95, TSH 1.37, 25-hydroxy vitamin D 61, glucose 105, white blood cell count 12,700, 10,500 neutrophils, hemoglobin 15.7, platelets 198,000, HIV screen nonreactive.  Urinalysis notable for trace ketones, 20 mg/deciliter protein, small blood, and 9 rbcs/hpf.     EKG was notable for normal sinus rhythm with P-wave prominence, suggestive of atrial enlargement, as noted previously.  Spirometry showed an FEV1 of 3.93, with an FVC of 4.89; readings were 104% and 98% of predicted values, respectively.     Preliminary DEXA showed normal bone density.  Body composition analysis showed 21.0% fat (6th percentile); 5/2003 readings showed 26.0% fat (26th percentile) and 7/2020 reading showed 23.1% fat; current body mass index 24.8     ASSESSMENT:     1.  Leukocytosis.  Mild, in the setting of recent symptoms of diminished appetite and chills, now resolved; on the day prior to symptom onset, he had exercised excessively.  No current localizing symptoms, apart from postnasal drainage, without sinus pain or purulent drainage.  We agreed that he would closely monitor for symptoms suggesting infection, including cough, facial pain, fever, and dysuria.  He will arrange repeat CBC with upcoming laboratory tests at work site.    2.  Abnormal urinalysis.  He was advised to submit a specimen for repeat UA after 1 week; if blood persists, he will submit a third specimen.  Further evaluation  will be arranged in the event of persistent hematuria, or proteinuria.     3.  Impaired fasting glucose.  We discussed the nature of this condition and the importance of continued regular exercise, maintenance of ideal weight, and adherence to a modified diet.  He will be advised of usual guidelines regarding type and frequency of monitoring.    4.  Excessive throat clearing.  Stefano describes ongoing postnasal drainage, without sinus pain, purulent drainage, or fever.  Because he did not proceed with the measures recommended at last visit, we agreed that he would now proceed as recommended previously: he will increase cetirizine to 10 mg twice daily and follow symptoms; if no better after 3 days, he will add fluticasone nasal inhaler, 1 puff each nostril daily, allowing 7-10 days for full effect.  If symptoms persist, he will discontinue fluticasone, revert to once daily cetirizine, and begin omeprazole, 20 mg daily.  If throat clearing sensation does not improve after 2 weeks of omeprazole treatment, he will pursue ENT consultation.  He will be advised to arrange further evaluation in the event of progressive symptoms such as fever, sinus pain, etc.    5.  Groin pain.  Examination and description of symptoms suggests a right hip adductor strain.  We reviewed appropriate stretching and band exercises, with which he will monitor symptoms for up to 4-6 weeks.  I recommended physical therapy evaluation and treatment if symptoms have not resolved by that time.     6.  Tremor.  Intention tremor, with no evidence of resting tremor, cogwheel rigidity, or other features suggestive of Parkinson's disease.  We discussed the nature of essential tremor, available treatments, and the risk for alcohol misuse.  He agrees to pursue further evaluation in the event of progression or new symptoms suggestive of Parkinson's disease.    7.  Dyslipidemia.  Using current readings and guidelines from the AHA/ACC, his estimated 10-year risk  "of a vascular event is 6.0%, the optimal level for his cohort.  Stefano was congratulated for his dramatic improvement in lifestyle and resultant weight loss.  Previously, when cholesterol profile was less favorable, atorvastatin, treatment was recommended; he elected not to begin treatment due to concerns regarding potential effect on memory.     8.  Insomnia.  Mild initial insomnia, managed with diphenhydramine.  Based on his mini-cog score and concerns regarding potential medication effects on cognition, I will recommend he discontinue use of doxylamine succinate while continuing to follow usual \"sleep hygiene\" measures.  Further evaluation and discussion of alternative management should be arranged in the event of recurrent insomnia.    9.  Preventive care.  I recommended Covid vaccination boosters per CDC guidelines and hepatitis B vaccination series if serology suggests that he is not immune.  Colonoscopy was completed in 2019 and will be due in 2024.  He was congratulated for his regimen of regular exercise and commitment to a healthful diet.  I recommended daily use of a 50 mcg vitamin D3 supplement, while maintaining daily calcium intake of 1200 mg, preferably from dietary sources.  We reviewed the roles of various types of exercise and the elements of a healthful diet.     PLAN: See above.     ~SRT      Again, thank you for allowing me to participate in the care of your patient.      Sincerely,    Kyle Floyd MD      "

## 2023-05-09 NOTE — PATIENT INSTRUCTIONS
Patient Education     Checking for Skin Cancer  You can find cancer early by checking your skin each month. There are 3 kinds of skin cancer. They are melanoma, basal cell carcinoma, and squamous cell carcinoma. Doing monthly skin checks is the best way to find new marks or skin changes. Follow the instructions below for checking your skin.   The ABCDEs of checking moles for melanoma   Check your moles or growths for signs of melanoma using ABCDE:   Asymmetry: the sides of the mole or growth don t match  Border: the edges are ragged, notched, or blurred  Color: the color within the mole or growth varies  Diameter: the mole or growth is larger than 6 mm (size of a pencil eraser)  Evolving: the size, shape, or color of the mole or growth is changing (evolving is not shown in the images below)    Checking for other types of skin cancer  Basal cell carcinoma or squamous cell carcinoma have symptoms such as:     A spot or mole that looks different from all other marks on your skin  Changes in how an area feels, such as itching, tenderness, or pain  Changes in the skin's surface, such as oozing, bleeding, or scaliness  A sore that does not heal  New swelling or redness beyond the border of a mole    Who s at risk?  Anyone can get skin cancer. But you are at greater risk if you have:   Fair skin, light-colored hair, or light-colored eyes  Many moles or abnormal moles on your skin  A history of sunburns from sunlight or tanning beds  A family history of skin cancer  A history of exposure to radiation or chemicals  A weakened immune system  If you have had skin cancer in the past, you are at risk for recurring skin cancer.   How to check your skin  Do your monthly skin checkups in front of a full-length mirror. Check all parts of your body, including your:   Head (ears, face, neck, and scalp)  Torso (front, back, and sides)  Arms (tops, undersides, upper, and lower armpits)  Hands (palms, backs, and fingers, including  under the nails)  Buttocks and genitals  Legs (front, back, and sides)  Feet (tops, soles, toes, including under the nails, and between toes)  If you have a lot of moles, take digital photos of them each month. Make sure to take photos both up close and from a distance. These can help you see if any moles change over time.   Most skin changes are not cancer. But if you see any changes in your skin, call your doctor right away. Only he or she can diagnose a problem. If you have skin cancer, seeing your doctor can be the first step toward getting the treatment that could save your life.   [employer] last reviewed this educational content on 4/1/2019 2000-2020 [employer]. [address] [place] [address], [place] [address]. All rights reserved. This information is not intended as a substitute for professional medical care. Always follow your healthcare professional's instructions.       When should I call my doctor?  If you are worsening or not improving, please, contact us or seek urgent care as noted below.     Who should I call with questions (adults)?  [facility] (adult and pediatric): [phone]  [facility] (adult): [phone]  For urgent needs outside of business hours call the [facility] at [phone] and ask for the dermatology resident on call to be paged  If this is a medical emergency and you are unable to reach an ER, Call [phone]    Who should I call with questions (pediatric)?  [facility]- Pediatric Dermatology  Dr. Ayesha Fernandes, Dr. Kristy Fregoso, Dr. Itzel Aponte, FOREST Tomas, Dr. Charmaine Parker, Dr. Kezia Newton & Dr. Deep Emmanuel  Non-urgent nurse triage line; [phone]- Felicia and Uzma BRUSH Care Coordinatorhero Alexandre (/Complex ) [phone]    If you need a prescription refill, please contact your pharmacy. Refills are approved or denied by our  Physicians during normal business hours, Monday through Fridays  Per office policy, refills will not be granted if you have not been seen within the past year (or sooner depending on your child's condition)    Scheduling Information:  Pediatric Appointment Scheduling and Call Center [phone]  Radiology Scheduling- [phone]  Sedation Unit Scheduling- [phone]  Driver Scheduling- General [phone]; Pediatric Dermatology [phone]  Main  Services: [phone]  Kiswahili: [phone]  Burkinan: [phone]  Hmong/Citizen of [place]/Pashto: [phone]  Preadmission Nursing Department Fax Number: [phone] (Fax all pre-operative paperwork to this number)    For urgent matters arising during evenings, weekends, or holidays that cannot wait for normal business hours please call [phone] and ask for the dermatology resident on call to be paged.

## 2023-05-09 NOTE — PROGRESS NOTES
History and Physical Examination     SUBJECTIVE: Chief complaint: preventive health review.     Past Medical History:  1.  Allergic rhinitis, typically noted in hope  2.  History of adenomatous colonic polyps    3.  Dyslipidemia  4.  Status post excision of benign tumor, right pretibial region, circa 1990  5.  Status post bilateral LASIK   6.  Erectile dysfunction  7.  Left atrial enlargement  8.  Family history of AAA rupture; abdominal ultrasound negative for AAA in 5/2022  9.  Eczema     Adverse Drug Reactions: None.     Current Medications:  Vitamin D3, 25 mcg daily  Doxylamine succinate (Unisom), nightly as needed  Sildenafil, 100 mg daily as needed  Cetirizine, 10 mg daily as needed  Fluocinonide, 0.05%, apply topically as needed  Daily multivitamin supplement (for men over age 50),  L-arginine, 1000 mg daily     Habits:  Tobacco: Never  Alcohol: 2-3 servings, one day per week  Caffeine: Occasional serving of tea.  Street drugs: None     Social History: Stefano  in 2011 and ended an 8-year relationship with Dayana in September, 2022.  Father of 2 children from his 20-year marriage: daughter Kristina, 26, a [employer] graduate who was a competitive swimmer in high school and now works in [facility] [employer] for [employer]; and daughter Kaley, age 22, who enjoys [employer], a recent graduate of [employer] in [place] who now works for a [place] investment firm.  Stefano is a native of the [place] area who attended [employer], where he studied economics, before attending graduate school at [employer].  He moved to the [place] in 2018 after working in [place] and [place].  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  Away from work, he enjoys golf and other exercise, currently exercising most days of the week, alternating among pickle ball games that last up to 2.5 hours, playing golf,  "working out with hand weights, elliptical device workouts, and \"warrior sculpt\" yoga for 60 minutes.     Family History: Mother  in  at age 98; she was a retired nurse anesthetist with history of breast cancer, hypertension, vertigo, and dementia.  Father  at age 72 from AAA rupture, with no history of tobacco use or known medical problems.  A sister  at approximately age 50 from AIDS.  A sister 4 years his senior is a breast cancer survivor.  Younger daughter has congenital cataracts; older daughter has an unspecified gastrointestinal disorder.  Paternal grandparents  in their 80s.  Medical history of maternal grandparents is not known.     Review of Systems: Three days ago, Stefano noted chills and diminished appetite, without fever, cough, flank or abdominal pain, dysuria, or hematuria; the day prior to the onset of symptoms, he had played [employer] for 2.5 hours, then attended a 60-minute hot yoga session, following which he hit golf balls at a driving range.  Several-month history of medial right proximal thigh discomfort, noted with sudden position changes and stair climbing; no associated back discomfort or more distal symptoms.  Mild intermittent intention tremor, which does not interfere with her usual activities.  Stefano uses doxylamine succinate nightly to address insomnia; this also helps treat allergic rhinitis symptoms.  No significant problems with memory or cognition. Known intermittent mild snoring, without known gasping, observed apnea, or daytime somnolence.  He indicates that he falls into a deep sleep, awakens after 4 hours, then returns to sleep in a state he describes as less restful; he awakens in the morning feeling refreshed.  Persistent frequent throat clearing, with significant morning phlegm production, without heartburn, cough, dyspnea, or wheezing.  Recurrent pattern of persistent non-productive cough following URIs.  Currently sexually inactive; previously he noted " "that erectile dysfunction responded better to sildenafil when tadalafil.  Most recent colonoscopy was negative in 7/2019.  Most recent tetanus (Tdap) was administered in 7/2020.  Recombinant zoster vaccination series completed in 10/2020.  Covid (Pfizer) vaccinations administered on 4/14/2021, 5/7/2021, and 12/18/2021.  Remainder of complete review of systems was negative.     OBJECTIVE:     Vital signs: Height 73.75 inches.  Weight 187 pounds.  Blood pressure 111/73 on average of 3 automated readings.  Heart rate 74.  Respiratory rate 16.  Temperature 98.1 degrees.  O2 saturation 97% on room air.  General: Alert, neatly dressed and groomed, in no acute distress.  HEENT: Atraumatic and normocephalic. Eyelids, pupils, and conjunctivae appeared normal. Lips, teeth and gums appear normal.  Oropharynx showed moist mucous membranes, without exudate or erythema; somewhat \"crowded\" soft palate with mildly narrowed airway.  Neck: Supple, without thyromegaly, mass, or bruit. No cervical or supraclavicular lymphadenopathy.  Back: No spinal or costovertebral angle tenderness.  Chest: Clear to auscultation and percussion. Normal respiratory effort.  Cardiovascular: No jugular venous distention. Regular rate and rhythm, normal S1, S2 without murmur.  Abdomen: Bowel sounds positive; soft, nontender, without rebound, guarding, hepatosplenomegaly or mass.  Extremities: No cyanosis or edema.  Genitalia: Normal male genitalia, without scrotal mass or hernia. No inguinal lymphadenopathy.  Rectal: Normal tone, with smooth, nontender, non-enlarged prostate. No rectal mass.  Skin: Examination was deferred; full evaluation was completed earlier in day through dermatology clinic.  Neurologic: Cranial nerves II-XII were grossly intact. Sensory and motor examinations were normal. Normal gait.  Fine intention tremor with no resting tremor.  No cogwheel rigidity.  Normal finger-nose-finger and heel-shin testing.  Mini-cog score was " 3/5.  Psychiatric: Alert and oriented ×3. Normal affect. Judgment and insight intact.  PHQ-2 score was 0.     Creatinine 1.11, alkaline phosphatase 58, ALT 21, cholesterol 198, HDL 61, , triglycerides 100, cholesterol/HDL 3.2, PSA 0.95, TSH 1.37, 25-hydroxy vitamin D 61, glucose 105, white blood cell count 12,700, 10,500 neutrophils, hemoglobin 15.7, platelets 198,000, HIV screen nonreactive.  Urinalysis notable for trace ketones, 20 mg/deciliter protein, small blood, and 9 rbcs/hpf.     EKG was notable for normal sinus rhythm with P-wave prominence, suggestive of atrial enlargement, as noted previously.  Spirometry showed an FEV1 of 3.93, with an FVC of 4.89; readings were 104% and 98% of predicted values, respectively.     Preliminary DEXA showed normal bone density.  Body composition analysis showed 21.0% fat (6th percentile); 5/2003 readings showed 26.0% fat (26th percentile) and 7/2020 reading showed 23.1% fat; current body mass index 24.8     ASSESSMENT:     1.  Leukocytosis.  Mild, in the setting of recent symptoms of diminished appetite and chills, now resolved; on the day prior to symptom onset, he had exercised excessively.  No current localizing symptoms, apart from postnasal drainage, without sinus pain or purulent drainage.  We agreed that he would closely monitor for symptoms suggesting infection, including cough, facial pain, fever, and dysuria.  He will arrange repeat CBC with upcoming laboratory tests at work site.    2.  Abnormal urinalysis.  He was advised to submit a specimen for repeat UA after 1 week; if blood persists, he will submit a third specimen.  Further evaluation will be arranged in the event of persistent hematuria, or proteinuria.     3.  Impaired fasting glucose.  We discussed the nature of this condition and the importance of continued regular exercise, maintenance of ideal weight, and adherence to a modified diet.  He will be advised of usual guidelines regarding type and  frequency of monitoring.    4.  Excessive throat clearing.  Stefano describes ongoing postnasal drainage, without sinus pain, purulent drainage, or fever.  Because he did not proceed with the measures recommended at last visit, we agreed that he would now proceed as recommended previously: he will increase cetirizine to 10 mg twice daily and follow symptoms; if no better after 3 days, he will add fluticasone nasal inhaler, 1 puff each nostril daily, allowing 7-10 days for full effect.  If symptoms persist, he will discontinue fluticasone, revert to once daily cetirizine, and begin omeprazole, 20 mg daily.  If throat clearing sensation does not improve after 2 weeks of omeprazole treatment, he will pursue ENT consultation.  He will be advised to arrange further evaluation in the event of progressive symptoms such as fever, sinus pain, etc.    5.  Groin pain.  Examination and description of symptoms suggests a right hip adductor strain.  We reviewed appropriate stretching and band exercises, with which he will monitor symptoms for up to 4-6 weeks.  I recommended physical therapy evaluation and treatment if symptoms have not resolved by that time.     6.  Tremor.  Intention tremor, with no evidence of resting tremor, cogwheel rigidity, or other features suggestive of Parkinson's disease.  We discussed the nature of essential tremor, available treatments, and the risk for alcohol misuse.  He agrees to pursue further evaluation in the event of progression or new symptoms suggestive of Parkinson's disease.    7.  Dyslipidemia.  Using current readings and guidelines from the AHA/ACC, his estimated 10-year risk of a vascular event is 6.0%, the optimal level for his cohort.  Stefano was congratulated for his dramatic improvement in lifestyle and resultant weight loss.  Previously, when cholesterol profile was less favorable, atorvastatin, treatment was recommended; he elected not to begin treatment due to concerns regarding  "potential effect on memory.     8.  Insomnia.  Mild initial insomnia, managed with diphenhydramine.  Based on his mini-cog score and concerns regarding potential medication effects on cognition, I will recommend he discontinue use of doxylamine succinate while continuing to follow usual \"sleep hygiene\" measures.  Further evaluation and discussion of alternative management should be arranged in the event of recurrent insomnia.    9.  Preventive care.  I recommended Covid vaccination boosters per CDC guidelines and hepatitis B vaccination series if serology suggests that he is not immune.  Colonoscopy was completed in 2019 and will be due in 2024.  He was congratulated for his regimen of regular exercise and commitment to a healthful diet.  I recommended daily use of a 50 mcg vitamin D3 supplement, while maintaining daily calcium intake of 1200 mg, preferably from dietary sources.  We reviewed the roles of various types of exercise and the elements of a healthful diet.     PLAN: See above.     ~SRT  "

## 2023-05-09 NOTE — PROGRESS NOTES
Stefano Merino comes into clinic today at the request of Dr. ANDREW Floyd Ordering Provider for EKG.    This service provided today was under the supervising provider of the day Dr. ANDREW Floyd, who was available if needed.    Digna Trevizo, [facility]

## 2023-05-10 LAB
ATRIAL RATE - MUSE: 67 BPM
DIASTOLIC BLOOD PRESSURE - MUSE: NORMAL MMHG
INTERPRETATION ECG - MUSE: NORMAL
P AXIS - MUSE: 82 DEGREES
PR INTERVAL - MUSE: 124 MS
QRS DURATION - MUSE: 80 MS
QT - MUSE: 400 MS
QTC - MUSE: 422 MS
R AXIS - MUSE: 36 DEGREES
SYSTOLIC BLOOD PRESSURE - MUSE: NORMAL MMHG
T AXIS - MUSE: 50 DEGREES
VENTRICULAR RATE- MUSE: 67 BPM

## 2023-05-11 LAB
EXPTIME-PRE: 7.4 SEC
FEF2575-%PRED-PRE: 133 %
FEF2575-PRE: 4.16 L/SEC
FEF2575-PRED: 3.11 L/SEC
FEFMAX-%PRED-PRE: 107 %
FEFMAX-PRE: 10.9 L/SEC
FEFMAX-PRED: 10.15 L/SEC
FEV1-%PRED-PRE: 104 %
FEV1-PRE: 3.93 L
FEV1FEV6-PRE: 82 %
FEV1FEV6-PRED: 79 %
FEV1FVC-PRE: 82 %
FEV1FVC-PRED: 78 %
FIFMAX-PRE: 8.23 L/SEC
FVC-%PRED-PRE: 98 %
FVC-PRE: 4.81 L
FVC-PRED: 4.89 L

## 2023-05-18 ENCOUNTER — LAB (OUTPATIENT)
Dept: LAB | Facility: CLINIC | Age: 63
End: 2023-05-18
Payer: COMMERCIAL

## 2023-05-18 DIAGNOSIS — Z00.00 ENCOUNTER FOR PREVENTATIVE ADULT HEALTH CARE EXAMINATION: ICD-10-CM

## 2023-05-18 LAB
ALBUMIN UR-MCNC: NEGATIVE MG/DL
APPEARANCE UR: CLEAR
BACTERIA #/AREA URNS HPF: ABNORMAL /HPF
BILIRUB UR QL STRIP: NEGATIVE
COLOR UR AUTO: COLORLESS
GLUCOSE UR STRIP-MCNC: NEGATIVE MG/DL
HGB UR QL STRIP: NEGATIVE
KETONES UR STRIP-MCNC: NEGATIVE MG/DL
LEUKOCYTE ESTERASE UR QL STRIP: NEGATIVE
NITRATE UR QL: NEGATIVE
PH UR STRIP: 6.5 [PH] (ref 5–7)
RBC #/AREA URNS AUTO: ABNORMAL /HPF
SP GR UR STRIP: 1 (ref 1–1.03)
UROBILINOGEN UR STRIP-MCNC: NORMAL MG/DL
WBC #/AREA URNS AUTO: ABNORMAL /HPF

## 2023-05-18 PROCEDURE — 81001 URINALYSIS AUTO W/SCOPE: CPT

## 2023-06-06 ENCOUNTER — TELEPHONE (OUTPATIENT)
Dept: DERMATOLOGY | Facility: CLINIC | Age: 63
End: 2023-06-06
Payer: COMMERCIAL

## 2023-06-06 DIAGNOSIS — L30.9 DERMATITIS: Primary | ICD-10-CM

## 2023-06-06 NOTE — TELEPHONE ENCOUNTER
[facility] Call Center    Phone Message    May a detailed message be left on voicemail: yes     Reason for Call: Medication Refill Request    Has the patient contacted the pharmacy for the refill? Yes   Name of medication being requested: fluocinonide (LIDEX) 0.05 % external cream    Provider who prescribed the medication: (pt cannot remember who did - pt was wondering if Dr. Bear would be able to prescribe it for him)    Pharmacy: [address] [address]    Date medication is needed: pt has enough for 1-2 days. Please call the pt back if you have any questions. Thanks          Action Taken: Message routed to:  [facility] ([facility] DERM    Travel Screening: Not Applicable

## 2023-06-07 ENCOUNTER — RX ONLY (RX ONLY)
Age: 63
End: 2023-06-07

## 2023-06-07 RX ORDER — FLUOCINONIDE 0.5 MG/G
CREAM TOPICAL
Qty: 60 | Refills: 3 | Status: CANCELLED
Stop reason: CLARIF

## 2023-06-07 RX ORDER — FLUOCINONIDE 0.5 MG/G
CREAM TOPICAL 2 TIMES DAILY
Qty: 60 G | Refills: 3 | Status: SHIPPED | OUTPATIENT
Start: 2023-06-07

## 2023-06-07 NOTE — TELEPHONE ENCOUNTER
Yes ok  to refill and can you please read him this message? I sent it over [employer] during his visit but it has not yet been read    Hi Mr. Merino,     Very nice to meet you today. Sorry for a strange request but when the ink comes off of your left first finger, would you be so kind to send me a photo for your record? I keep looking at the photo I took today and wish I had rubbed off the ink to be sure it all went away.    Sincerely  Zabrina Bear MD

## 2023-06-07 NOTE — TELEPHONE ENCOUNTER
Called and verifed pt by last name and . Informed patient about message from provider and that rx was sent to [facility],

## 2023-06-07 NOTE — TELEPHONE ENCOUNTER
Dermatology Problem List:  1. Dermatitis - R volar forearm - s/p bx 9/18/20 with spongiotic dermatitis with eosinophils   - lidex cream  - prior rx: triamcinolone 0.025% cream

## 2023-10-03 ENCOUNTER — OFFICE VISIT (OUTPATIENT)
Dept: FAMILY MEDICINE | Facility: CLINIC | Age: 63
End: 2023-10-03
Payer: COMMERCIAL

## 2023-10-03 DIAGNOSIS — N52.9 ERECTILE DYSFUNCTION, UNSPECIFIED ERECTILE DYSFUNCTION TYPE: Primary | ICD-10-CM

## 2023-10-03 DIAGNOSIS — F52.4 ACQUIRED GENERALIZED PREMATURE EJACULATION: ICD-10-CM

## 2023-10-03 DIAGNOSIS — E78.5 DYSLIPIDEMIA: ICD-10-CM

## 2023-10-03 PROCEDURE — 99205 OFFICE O/P NEW HI 60 MIN: CPT | Performed by: FAMILY MEDICINE

## 2023-10-03 NOTE — PROGRESS NOTES
Sexual Medical Evaluation          HPI   ID: 62 year old male   CC: Here for medical evaluation for ED and premature ejaculation  HPI:  All sexual partners have been cisgender women  Patient was  for 20 years without sexual concerns. He was then , had one sexual partner in 12 years, no sexual concerns. He then had a long term relationship x 8 years, were engaged. The relationship ended in 9/2022 due to her want more long term commitment than him. He had no sexual concerns until the last year of their relationship, when he developed difficulty getting strong erections, as well as earlier ejaculation, which began  As of 10/2022, he began a new relationship relationship with a woman 25 years younger and  and continues to have problems with erections if not using PDE-5 inhibitors, but and is especially concerned about early ejaculation.In 1/2023, also time to ejaculation down from 15 minutes to 5 minutes, again related to mismatch of relationship expectations.  --has never tried any interventions, nothing seems to make better/worse    Libido: intact  Erections:  +AM erections with arginine supplement  Rarely masturbates  During relationship with financee, bought sildenafil over Internet  With 100 mg dose able to obtain strong erections and maintain them well % of time, some flushing as side effect. Without, only 25% of time able to get strong erections.  Also also tried Cialis, not worked as well.   He has discussed ED with  PCP, but not earlier ejaculation.      ED risk factors:  Age  Hyper lipiidemia, was on statin x 6 month, now total cholesterol 198,  on diet control  Never smoker  Borderline impaired fasting glucose 105  Normotensive  Alcohol 2x/wk, 2/sitting  Father--anging, aortic aneurysm      Current medical conditions:  Patient Active Problem List   Diagnosis    Erectile dysfunction of organic origin    Allergic rhinitis    Dyslipidemia             No Known Allergies     Current  medications:  Current Outpatient Medications   Medication    arginine 1000 MG tablet    cetirizine (ZYRTEC) 10 MG tablet    Cholecalciferol (VITAMIN D3) 25 MCG (1000 UT) CAPS    doxylamine (UNISOM) 25 MG TABS tablet    fluocinonide (LIDEX) 0.05 % external cream    Multiple Vitamin (MULTIVITAMIN ADULT PO)    sildenafil (VIAGRA) 100 MG tablet    triamcinolone (KENALOG) 0.025 % external ointment    vardenafil (LEVITRA) 20 MG tablet     No current facility-administered medications for this visit.        Past Medical History:  Past Medical History:   Diagnosis Date    Colon polyps     Hyperlipidemia over fiftenn years    HAve not had to medicate    Seasonal allergies       Past Surgical History:   Procedure Laterality Date    CHOLECYSTECTOMY      hx polyps    LASIK Bilateral     SOFT TISSUE SURGERY      benign lump removed from leg          Family History:  Family History   Problem Relation Age of Onset    Breast Cancer Mother 75    Hypertension Mother     Angina Father     Breast Cancer Sister     Depression Sister     Cataracts Daughter     Glaucoma No family hx of     Macular Degeneration No family hx of     Skin Cancer No family hx of         Social History:  Standard diety +dairy  Never smoker  Activity: daily, golf, hot yoga, lifting, pickleball  Alcohol as above  No substances  , 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        Sexual History:  Attracted to women  Approximately 25 partners in lifetime  No history STI  HIV negative 2023  Not immune Hep B--vaccine recommended      ROS  Occasional cough  Joint pain  Nighttime urinattion 1-2x/night  Decreased stream  Increased dribbling  Empties bladder fully, no trouble starting stream   Frequency: every 3-4 hours  12 point ROS negative except where noted above      Physical Exam  EXAM:  Vitals reviewed  Constitutional: healthy, alert, and no distress   Psychiatric: mentation appears normal, affect normal/bright, and anxious   5/9/2023 PCP complete physical exam, including   reviewed, note reviewed  Labs from this visit reviewed; testosterone total 431 3/28/2022    A/P  Early ejaculation, acquired  Erectile dysfunction  Hypercholesterol  Counseled patient on the multifactorial nature of acquired reduction in time to ejaculation.and wide range of normal ejaculation time.  No evidence of systemic or  local/ disease given duration of symptoms, history. Discussed that likely factors including relationship, sexual communication issues, expectations are factors, along with impact of ED on ejaculation in some men.   Counseled patient on interventions for early ejaculation including: topical desensitizer such as Promescent spray, sex therapy, stop/start and squeeze/start techniques, and use of selective serotonin reuptake inhibitor's. PDE-5' inhibtors can help as well. Strongly recommend using sex therapy, topicals and behavioral techniques first, with practice with masturbation before try with partner    Counseled regarding multifactorial nature of ED, with some relational and vascular risk factors.  Counseled to not mix different PDE-5 inhibitors, to not use more than recommended dose for any, to work with sex therapy and trial onset and dosing with masturbation first.    Follow-up prn    73 minutes spent by me on the date of the encounter doing chart review, review of test results, interpretation of tests, patient visit, and documentation

## 2023-10-17 VITALS
WEIGHT: 195.2 LBS | HEART RATE: 80 BPM | DIASTOLIC BLOOD PRESSURE: 80 MMHG | BODY MASS INDEX: 25.05 KG/M2 | SYSTOLIC BLOOD PRESSURE: 128 MMHG | HEIGHT: 74 IN

## 2023-10-17 ASSESSMENT — ANXIETY QUESTIONNAIRES
GAD7 TOTAL SCORE: 0
2. NOT BEING ABLE TO STOP OR CONTROL WORRYING: NOT AT ALL
GAD7 TOTAL SCORE: 0
3. WORRYING TOO MUCH ABOUT DIFFERENT THINGS: NOT AT ALL
1. FEELING NERVOUS, ANXIOUS, OR ON EDGE: NOT AT ALL
5. BEING SO RESTLESS THAT IT IS HARD TO SIT STILL: NOT AT ALL
6. BECOMING EASILY ANNOYED OR IRRITABLE: NOT AT ALL
7. FEELING AFRAID AS IF SOMETHING AWFUL MIGHT HAPPEN: NOT AT ALL

## 2023-10-17 ASSESSMENT — PATIENT HEALTH QUESTIONNAIRE - PHQ9
SUM OF ALL RESPONSES TO PHQ QUESTIONS 1-9: 1
5. POOR APPETITE OR OVEREATING: NOT AT ALL

## 2023-10-30 ENCOUNTER — OFFICE VISIT (OUTPATIENT)
Dept: FAMILY MEDICINE | Facility: CLINIC | Age: 63
End: 2023-10-30
Payer: COMMERCIAL

## 2023-10-30 DIAGNOSIS — N52.9 VASCULOGENIC ERECTILE DYSFUNCTION, UNSPECIFIED VASCULOGENIC ERECTILE DYSFUNCTION TYPE: Primary | ICD-10-CM

## 2023-10-30 PROCEDURE — 99214 OFFICE O/P EST MOD 30 MIN: CPT | Performed by: FAMILY MEDICINE

## 2023-10-30 RX ORDER — SILDENAFIL 100 MG/1
100 TABLET, FILM COATED ORAL DAILY PRN
Qty: 30 TABLET | Refills: 1 | Status: SHIPPED | OUTPATIENT
Start: 2023-10-30

## 2023-10-30 NOTE — PROGRESS NOTES
Juan C Agarwal is a 62 year old, presenting for the following health issues:  RECHECK (Erectile Dysfunction/)      HPI     Patient tried 40 mg of tadalifil from [place] pharmacy (usual dose for pulmonary htn, 40 mg daily) which worked better than others. However 100 mg sildenafil also works well, also from [place],  both  in maintaining erections and strength erections    He has the following questions and updates:  ---questions about what dose to use, how often can use, alternatives  ---Asks if further evalution needed  --Did not try Promescent spray, no premature ejacuation with higher dose ED medication  --benefits of urology input         Objective    Blood pressure 121/77, pulse 71, weight 85.1 kg (187 lb 9.6 oz).  Body mass index is 24.25 kg/m .      Physical Exam   Alert, NAD    A/P  Erectile dysfunction, unspecified  Reviewed the appropriate doses of PDE5 inhibitors   Can use tadalifil 20mg every other to every 2 days only; Sildenfil 100 mg daily only  Do not combine these drugs  Counseled to get prescription medications through a licensed provider. Do not buy overseas from internet, discussed possibly adulterated or even unknown productDisccussed Counseled patient regarding injectable penile medications via urology, possible urology evaluation if PDE 5 not working;   Reinforced Role of non vascular factors and role of sex therapy      Insurance not covering  PDE5's --counseled on goodrx  Refer urology at pt. Request  Discussed constrictions rings as adjunct    Follow-up prn      35 minutes spent by me on the date of the encounter doing chart review, patient visit, and documentation

## 2023-10-31 VITALS
WEIGHT: 187.6 LBS | SYSTOLIC BLOOD PRESSURE: 121 MMHG | HEART RATE: 71 BPM | DIASTOLIC BLOOD PRESSURE: 77 MMHG | BODY MASS INDEX: 24.25 KG/M2

## 2023-11-15 DIAGNOSIS — E78.5 DYSLIPIDEMIA: ICD-10-CM

## 2023-11-15 RX ORDER — ATORVASTATIN CALCIUM 10 MG/1
10 TABLET, FILM COATED ORAL DAILY
Qty: 90 TABLET | Refills: 3 | Status: SHIPPED | OUTPATIENT
Start: 2023-11-15

## 2023-12-04 ENCOUNTER — OFFICE VISIT (OUTPATIENT)
Dept: FAMILY MEDICINE | Facility: CLINIC | Age: 63
End: 2023-12-04
Payer: COMMERCIAL

## 2023-12-04 VITALS
HEIGHT: 73 IN | WEIGHT: 190 LBS | HEART RATE: 76 BPM | DIASTOLIC BLOOD PRESSURE: 75 MMHG | BODY MASS INDEX: 25.18 KG/M2 | SYSTOLIC BLOOD PRESSURE: 126 MMHG | TEMPERATURE: 97.7 F | OXYGEN SATURATION: 94 %

## 2023-12-04 DIAGNOSIS — Z01.818 PRE-OP EXAM: Primary | ICD-10-CM

## 2023-12-04 NOTE — NURSING NOTE
"ROOM:2  SARIAH CONTRERAS    Preferred Name: Stefano     How did you hear about us?  Other - Call Center    63 year old  Chief Complaint   Patient presents with     Pre-Op Exam       Blood pressure 126/75, pulse 76, temperature 97.7  F (36.5  C), temperature source Oral, height 1.854 m (6' 1\"), weight 86.2 kg (190 lb), SpO2 94%. Body mass index is 25.07 kg/m .  BP completed using cuff size:        Patient Active Problem List   Diagnosis     Erectile dysfunction of organic origin     Allergic rhinitis     Dyslipidemia       Wt Readings from Last 2 Encounters:   12/04/23 86.2 kg (190 lb)   05/09/23 84.8 kg (187 lb)     BP Readings from Last 3 Encounters:   12/04/23 126/75   05/09/23 109/72   07/30/20 120/82       No Known Allergies    Current Outpatient Medications   Medication     arginine 1000 MG tablet     atorvastatin (LIPITOR) 10 MG tablet     cetirizine (ZYRTEC) 10 MG tablet     Cholecalciferol (VITAMIN D3) 25 MCG (1000 UT) CAPS     doxylamine (UNISOM) 25 MG TABS tablet     fluocinonide (LIDEX) 0.05 % external cream     Multiple Vitamin (MULTIVITAMIN ADULT PO)     sildenafil (VIAGRA) 100 MG tablet     sildenafil (VIAGRA) 100 MG tablet     triamcinolone (KENALOG) 0.025 % external ointment     vardenafil (LEVITRA) 20 MG tablet     No current facility-administered medications for this visit.       Social History     Tobacco Use     Smoking status: Never     Smokeless tobacco: Never   Substance Use Topics     Alcohol use: Yes     Comment: One drink per week     Drug use: Never       Honoring Choices - Health Care Directive Guide offered to patient at time of visit.    Health Maintenance Due   Topic Date Due     ADVANCE CARE PLANNING  Never done     RSV VACCINE (Pregnancy & 60+) (1 - 1-dose 60+ series) Never done     INFLUENZA VACCINE (1) 09/01/2023     COVID-19 Vaccine (4 - 2023-24 season) 09/01/2023       Immunization History   Administered Date(s) Administered     COVID-19 MONOVALENT 12+ (Pfizer) 04/14/2021, " "05/07/2021, 12/18/2021     Influenza Vaccine 18-64 (Flublok) 10/01/2020     TDAP Vaccine (Boostrix) 07/30/2020     Zoster recombinant adjuvanted (SHINGRIX) 07/30/2020, 10/23/2020       No results found for: \"PAP\"    Recent Labs   Lab Test 05/09/23  0737 03/28/22  0717 07/30/20  0710   * 140* 137*   HDL 61 44 57   TRIG 100 118 73   ALT 21 26 34   CR 1.11 0.92 0.98   GFRESTIMATED 76 >90 85   GFRESTBLACK  --   --  >90   TSH 1.37 1.28 1.28           5/9/2023     7:26 AM 3/28/2022     7:16 AM   PHQ-2 ( 1999 Pfizer)   Q1: Little interest or pleasure in doing things 0    0    0 0    0    0   Q2: Feeling down, depressed or hopeless 0    0    0 0    0    0   PHQ-2 Score 0    0    0 0    0    0   Q1: Little interest or pleasure in doing things Not at all Not at all   Q2: Feeling down, depressed or hopeless Not at all Not at all   PHQ-2 Score 0 0           10/17/2023     8:54 AM   PHQ-9 SCORE   PHQ-9 Total Score 1           7/24/2020     9:18 AM 3/17/2022     5:15 PM 10/17/2023     8:54 AM   SANJANA-7 SCORE   Total Score 0 (minimal anxiety) 0 (minimal anxiety)    Total Score 0    0 0    0 0            No data to display                Nehemias Mario    December 4, 2023 9:55 AM    "

## 2023-12-04 NOTE — PROGRESS NOTES
[facility] [address] [address]  Phone: [phone]  Fax: [phone]  Primary Provider: Kyle Floyd  Pre-op Performing Provider: SARIAH CONTRERAS      PREOPERATIVE EVALUATION:  Today's date: 12/4/2023    Stefano is a 63 year old, presenting for the following: pre-op exam.        Surgical Information:  Surgery/Procedure: Torn Meniscus  Surgery Location: [facility]  Surgeon: Dr. Lin  Surgery Date: 12/28/23  Time of Surgery: TBD  Where patient plans to recover: At home with family  Fax number for surgical facility: [phone]    Assessment & Plan     The proposed surgical procedure is considered LOW risk.    Pre-op exam  VSS, pt in NAD. METS>4 with pickle ball and/or cardio 5 days per week.  Will hold off on repeat labs at this time in the absence of symptoms such as CP, SOB, activity intolerance, etc.           Risks and Recommendations:  The patient has the following additional risks and recommendations for perioperative complications:   - No identified additional risk factors other than previously addressed    Antiplatelet or Anticoagulation Medication Instructions:   - Patient is on no antiplatelet or anticoagulation medications.   - Bleeding risk is low for this procedure (e.g. dental, skin, cataract).    Additional Medication Instructions:  Patient is to take all scheduled medications on the day of surgery EXCEPT for modifications listed below:   - Statins: Continue taking on the day of surgery.    - sildenafil: HOLD for 24 hours.   - vardenafil: HOLD for 2 days prior to surgery   - Herbal medications and vitamins: HOLD 14 days prior to surgery.  -Topicals: Hold on day of surgery      RECOMMENDATION:  APPROVAL GIVEN to proceed with proposed procedure, without further diagnostic evaluation.    Review of external notes as documented elsewhere in note    Subjective       HPI related to upcoming procedure:     63-year-old male past medical  history HLD and erectile dysfunction presents for preop exam.  Patient reports he suffered a right torn meniscus from playing pickle ball.  He received a cortisone shot to his right knee which has been helpful.  He reports that he is scheduled for laparoscopic repair of his right torn meniscus on 12/28/2023 with Dr. Lin. Today, patient denies acute concerns/symptoms at time of exam.             12/4/2023     9:54 AM   Preop Questions   1. Have you ever had a heart attack or stroke? No   2. Have you ever had surgery on your heart or blood vessels, such as a stent placement, a coronary artery bypass, or surgery on an artery in your head, neck, heart, or legs? No   3. Do you have chest pain with activity? No   4. Do you have a history of  heart failure? No   5. Do you currently have a cold, bronchitis or symptoms of other infection? No   6. Do you have a cough, shortness of breath, or wheezing? No   7. Do you or anyone in your family have previous history of blood clots? No   8. Do you or does anyone in your family have a serious bleeding problem such as prolonged bleeding following surgeries or cuts? No   9. Have you ever had problems with anemia or been told to take iron pills? No   10. Have you had any abnormal blood loss such as black, tarry or bloody stools? No   11. Have you ever had a blood transfusion? No   12. Are you willing to have a blood transfusion if it is medically needed before, during, or after your surgery? Yes   13. Have you or any of your relatives ever had problems with anesthesia? No   14. Do you have sleep apnea, excessive snoring or daytime drowsiness? No   15. Do you have any artifical heart valves or other implanted medical devices like a pacemaker, defibrillator, or continuous glucose monitor? No   16. Do you have artificial joints? No   17. Are you allergic to latex? No     Health Care Directive:  Patient does not have a Health Care Directive or Living Will: Patient states has Advance  Directive and will bring in a copy to clinic.    Preoperative Review of :   reviewed - no record of controlled substances prescribed.      Status of Chronic Conditions:  See problem list for active medical problems.  Problems all longstanding and stable, except as noted/documented.  See ROS for pertinent symptoms related to these conditions.      Review of Systems  CONSTITUTIONAL: NEGATIVE for fever, chills, change in weight  INTEGUMENTARY/SKIN: NEGATIVE for worrisome rashes, moles or lesions  EYES: NEGATIVE for vision changes or irritation  ENT/MOUTH: NEGATIVE for ear, mouth and throat problems  RESP: NEGATIVE for significant cough or SOB  CV: NEGATIVE for chest pain, palpitations or peripheral edema  GI: NEGATIVE for nausea, abdominal pain, heartburn, or change in bowel habits  : NEGATIVE for frequency, dysuria, or hematuria  MUSCULOSKELETAL: As noted in HPI  NEURO: NEGATIVE for weakness, dizziness or paresthesias  ENDOCRINE: NEGATIVE for temperature intolerance, skin/hair changes  HEME: NEGATIVE for bleeding problems  PSYCHIATRIC: NEGATIVE for changes in mood or affect    Patient Active Problem List    Diagnosis Date Noted    Allergic rhinitis 07/29/2020     Priority: Medium    Dyslipidemia 07/29/2020     Priority: Medium    Erectile dysfunction of organic origin 07/26/2019     Priority: Medium     Did well with viagra, 50mg in the past but doing fine without med now.        Past Medical History:   Diagnosis Date    Colon polyps     Hyperlipidemia over fiftenn years    HAve not had to medicate    Seasonal allergies      Past Surgical History:   Procedure Laterality Date    CHOLECYSTECTOMY      hx polyps    LASIK Bilateral     SOFT TISSUE SURGERY      benign lump removed from leg     Current Outpatient Medications   Medication Sig Dispense Refill    arginine 1000 MG tablet Take 1 tablet (1,000 mg) by mouth daily      atorvastatin (LIPITOR) 10 MG tablet Take 1 tablet (10 mg) by mouth daily 90 tablet 3     "cetirizine (ZYRTEC) 10 MG tablet Take 10 mg by mouth daily      Cholecalciferol (VITAMIN D3) 25 MCG (1000 UT) CAPS Take 1,000 Units by mouth      doxylamine (UNISOM) 25 MG TABS tablet Take 25 mg by mouth At Bedtime      fluocinonide (LIDEX) 0.05 % external cream Apply topically 2 times daily As needed for rash 60 g 3    Multiple Vitamin (MULTIVITAMIN ADULT PO)       sildenafil (VIAGRA) 100 MG tablet Take 1 tablet (100 mg) by mouth daily as needed (sexual activity) 30 tablet 1    sildenafil (VIAGRA) 100 MG tablet Take 1 tablet (100 mg) by mouth daily as needed      triamcinolone (KENALOG) 0.025 % external ointment Apply topically 2 times daily 454 g 1    vardenafil (LEVITRA) 20 MG tablet Take 1 tablet (20 mg) by mouth daily as needed (E.D.) 20 tablet 4       No Known Allergies     Social History     Tobacco Use    Smoking status: Never    Smokeless tobacco: Never   Substance Use Topics    Alcohol use: Yes     Comment: One drink per week     Family History   Problem Relation Age of Onset    Breast Cancer Mother 75    Hypertension Mother     Angina Father     Breast Cancer Sister     Depression Sister     Cataracts Daughter     Glaucoma No family hx of     Macular Degeneration No family hx of     Skin Cancer No family hx of      History   Drug Use Unknown         Objective     /75   Pulse 76   Temp 97.7  F (36.5  C) (Oral)   Ht 1.854 m (6' 1\")   Wt 86.2 kg (190 lb)   SpO2 94%   BMI 25.07 kg/m        Physical Exam  Constitutional:       General: He is not in acute distress.     Appearance: He is not ill-appearing.   HENT:      Right Ear: Tympanic membrane normal.      Left Ear: Tympanic membrane normal.      Nose: No rhinorrhea.      Mouth/Throat:      Mouth: Mucous membranes are moist.      Pharynx: Oropharynx is clear.   Eyes:      Extraocular Movements: Extraocular movements intact.      Pupils: Pupils are equal, round, and reactive to light.   Cardiovascular:      Rate and Rhythm: Normal rate and regular " rhythm.      Heart sounds: Normal heart sounds. No murmur heard.  Pulmonary:      Effort: Pulmonary effort is normal. No respiratory distress.      Breath sounds: Normal breath sounds. No wheezing or rales.   Abdominal:      General: Bowel sounds are normal.      Palpations: Abdomen is soft.      Tenderness: There is no abdominal tenderness.   Musculoskeletal:      Cervical back: Neck supple.      Right lower leg: No edema.      Left lower leg: No edema.   Lymphadenopathy:      Cervical: No cervical adenopathy.   Skin:     General: Skin is warm and dry.   Neurological:      General: No focal deficit present.      Mental Status: He is alert.   Psychiatric:         Thought Content: Thought content normal.         Judgment: Judgment normal.           Recent Labs   Lab Test 05/09/23  0737 03/28/22  0717   HGB 15.7 15.3    223   CR 1.11 0.92        Diagnostics:  No labs pending at this time.   No EKG required for low risk surgery (cataract, skin procedure, breast biopsy, etc).  No EKG required, no history of coronary heart disease, significant arrhythmia, peripheral arterial disease or other structural heart disease.    5/9/23 EKG: NSR, Right atrial enlargement   Borderline ECG     METS>4- Exercises 5 days/week(cardio and/or pickle ball) without CP or SOB, etc.    Revised Cardiac Risk Index (RCRI):  The patient has the following serious cardiovascular risks for perioperative complications:   - No serious cardiac risks = 0 points     RCRI Interpretation: 0 points: Class I (very low risk - 0.4% complication rate)      All questions/concerns addressed. Patient stated understanding/agreement to plan of care.      Signed Electronically by: OWEN Zhao CNP  Copy of this evaluation report is provided to requesting physician.

## 2023-12-04 NOTE — PATIENT INSTRUCTIONS
Preparing for Your Surgery  Getting started  A nurse will call you to review your health history and instructions. They will give you an arrival time based on your scheduled surgery time. Please be ready to share:  Your doctor's clinic name and phone number  Your medical, surgical, and anesthesia history  A list of allergies and sensitivities  A list of medicines, including herbal treatments and over-the-counter drugs  Whether the patient has a legal guardian (ask how to send us the papers in advance)  Please tell us if you're pregnant--or if there's any chance you might be pregnant. Some surgeries may injure a fetus (unborn baby), so they require a pregnancy test. Surgeries that are safe for a fetus don't always need a test, and you can choose whether to have one.   If you have a child who's having surgery, please ask for a copy of Preparing for Your Child's Surgery.    Preparing for surgery  Within 10 to 30 days of surgery: Have a pre-op exam (sometimes called an H&P, or History and Physical). This can be done at a clinic or pre-operative center.  If you're having a , you may not need this exam. Talk to your care team.  At your pre-op exam, talk to your care team about all medicines you take. If you need to stop any medicines before surgery, ask when to start taking them again.  We do this for your safety. Many medicines can make you bleed too much during surgery. Some change how well surgery (anesthesia) drugs work.  Call your insurance company to let them know you're having surgery. (If you don't have insurance, call [phone].)  Call your clinic if there's any change in your health. This includes signs of a cold or flu (sore throat, runny nose, cough, rash, fever). It also includes a scrape or scratch near the surgery site.  If you have questions on the day of surgery, call your hospital or surgery center.  Eating and drinking guidelines  For your safety: Unless your surgeon tells you otherwise,  follow the guidelines below.  Eat and drink as usual until 8 hours before you arrive for surgery. After that, no food or milk.  Drink clear liquids until 2 hours before you arrive. These are liquids you can see through, like water, Gatorade, and Propel Water. They also include plain black coffee and tea (no cream or milk), candy, and breath mints. You can spit out gum when you arrive.  If you drink alcohol: Stop drinking it the night before surgery.  If your care team tells you to take medicine on the morning of surgery, it's okay to take it with a sip of water.  Preventing infection  Shower or bathe the night before and morning of your surgery. Follow the instructions your clinic gave you. (If no instructions, use regular soap.)  Don't shave or clip hair near your surgery site. We'll remove the hair if needed.  Don't smoke or vape the morning of surgery. You may chew nicotine gum up to 2 hours before surgery. A nicotine patch is okay.  Note: Some surgeries require you to completely quit smoking and nicotine. Check with your surgeon.  Your care team will make every effort to keep you safe from infection. We will:  Clean our hands often with soap and water (or an alcohol-based hand rub).  Clean the skin at your surgery site with a special soap that kills germs.  Give you a special gown to keep you warm. (Cold raises the risk of infection.)  Wear special hair covers, masks, gowns and gloves during surgery.  Give antibiotic medicine, if prescribed. Not all surgeries need antibiotics.  What to bring on the day of surgery  Photo ID and insurance card  Copy of your health care directive, if you have one  Glasses and hearing aids (bring cases)  You can't wear contacts during surgery  Inhaler and eye drops, if you use them (tell us about these when you arrive)  CPAP machine or breathing device, if you use them  A few personal items, if spending the night  If you have . . .  A pacemaker, ICD (cardiac defibrillator) or other  implant: Bring the ID card.  An implanted stimulator: Bring the remote control.  A legal guardian: Bring a copy of the certified (court-stamped) guardianship papers.  Please remove any jewelry, including body piercings. Leave jewelry and other valuables at home.  If you're going home the day of surgery  You must have a responsible adult drive you home. They should stay with you overnight as well.  If you don't have someone to stay with you, and you aren't safe to go home alone, we may keep you overnight. Insurance often won't pay for this.  After surgery  If it's hard to control your pain or you need more pain medicine, please call your surgeon's office.  Questions?   If you have any questions for your care team, list them here: _________________________________________________________________________________________________________________________________________________________________________ ____________________________________ ____________________________________ ____________________________________  For informational purposes only. Not to replace the advice of your health care provider. Copyright   2003, 2019 [employer] [facility]. All rights reserved. Clinically reviewed by Isabella Sorensen MD. [employer]ART[employer] 684730 - REV 12/22.    How to Take Your Medication Before Surgery  - Take all of your medications before surgery except as noted below  - STOP taking all vitamins and herbal supplements 14 days before surgery.  - Atorvastatin- continue without modification   - sildenafil: HOLD for 24 hours.   - vardenafil: HOLD for 2 days prior to surgery  -Topicals: Hold on day of surgery

## 2024-01-17 ENCOUNTER — OFFICE VISIT (OUTPATIENT)
Dept: UROLOGY | Facility: CLINIC | Age: 64
End: 2024-01-17
Attending: FAMILY MEDICINE
Payer: COMMERCIAL

## 2024-01-17 DIAGNOSIS — N52.9 VASCULOGENIC ERECTILE DYSFUNCTION, UNSPECIFIED VASCULOGENIC ERECTILE DYSFUNCTION TYPE: ICD-10-CM

## 2024-01-17 PROCEDURE — 99204 OFFICE O/P NEW MOD 45 MIN: CPT | Performed by: UROLOGY

## 2024-01-17 NOTE — LETTER
1/17/2024       RE: Stefano Merino  [address]  [place] [address]     Dear Colleague,    Thank you for referring your patient, Stefano Merino, to the [facility]. Please see a copy of my visit note below.    I am seeing Mr. Stefano Merino in consultation from Dr. Olivares for evaluation of erectile dysfunction.    HPI:  Mr Merino is a very nice 63 year old man with complaints of erectile dysfunction for the last half decade.  Started sildenafil about 5-6 years ago for ED.    At this time, taking 125mg sildenafil.  This is working well for him, without bothersome side effects.  He uses the sildenafil pretty frequently, uses 3-4x/week.    Notes difficulty both in getting and maintaining erections.  Some premature ejaculation also.    He's signed up also for low intensity shockwave therapy, has not started this yet.  I discussed that the American urologic Association does not recommend these treatments.  Cost is not supported by the degree of expected benefit.    ED/Vascular disease risk factors:  HTN:   No  Hyperlipidemia: yes, treated   Smoking: no    DM: no   Cardiovascular disease: None  known  Meds associated with ED that he's taking: no  Anxiety/anger/depression: no   Penile Plaques or curvature: slight left curve.   Testosterone checked 18 months ago and was normal.      PAST MEDICAL HX:  Past Medical History:   Diagnosis Date     Colon polyps      Hyperlipidemia over fiftenn years    HAve not had to medicate     Seasonal allergies        PAST SURG HX:  Past Surgical History:   Procedure Laterality Date     CHOLECYSTECTOMY      hx polyps     LASIK Bilateral      SOFT TISSUE SURGERY      benign lump removed from leg        FAMILY HX:  Family History   Problem Relation Age of Onset     Breast Cancer Mother 75     Hypertension Mother      Angina Father      Breast Cancer Sister      Depression Sister      Cataracts Daughter       Glaucoma No family hx of      Macular Degeneration No family hx of      Skin Cancer No family hx of        SOCIAL HX:  Social History     Tobacco Use     Smoking status: Never     Smokeless tobacco: Never   Substance Use Topics     Alcohol use: Yes     Comment: One drink per week     Drug use: Never       MEDICATIONS:  Only taking sildenafil and statin at this time.       ALLERGIES:  Patient has no known allergies.      GENERAL PHYSICAL EXAM:   Constitutional: No acute distress. Well nourished.   PSYCH: normal mood and affect.  NEURO: normal gait, no focal deficits.   EYES: anicteric, EOMI, PERR.  CARDIOPULMONARY: breathing non-labored, pulse regular rate/rhythm, no peripheral edema.  GI: Abdomen soft, non-tender, nondistended    MUSCULOSKELETAL: normal limb proportions, no muscle wasting, no contractures.  SKIN: Normal virilized hair distribution, no lesions, warts or rashes over genitalia, abdomen extremities or face.  HEME/LYMPH: no ecchymosis, no lymphadenopathy in groin, no lymphedema.     EXAM:  Phallus  circumcised, meatus adequate, no plaques palpated.   Left testis descended , size is normal  , consistency is normal . No intra-testicular masses.   Right testis descended , size is normal  , consistency is normal . No intra-testicular masses.   Epididymes present, non-tender, not-enlarged.   Cord structures not remarkable.     Prostate exam: deferred     Imaging/labs:  Lab Results   Component Value Date    CR 1.11 05/09/2023    CR 0.92 03/28/2022    CR 0.98 07/30/2020     Lab Results   Component Value Date    PSA 0.95 05/09/2023    PSA 1.09 03/28/2022    PSA 0.94 07/30/2020         ASSESSMENT:     - Erectile Dysfunction (ED): discussed options including VCD, ICI, MUSE, PDE5, and IPP with Mr. Merino .     Normal PSA values      PLAN:    - Erectile Dysfunction (ED): discussed options including VCD, ICI, MUSE, PDE5, and IPP with Mr. Merino.  Discussed that arterial disease is the most common cause of ED  in the 58-27-bbow-old age group, but he could have a component of venoocclusive dysfunction also.  He would like to do further objective testing.      I discussed that ED is a strong predictor for cardiovascular disease and cardiac events, and I am concerned that he could be at risk for a cardiac event in the future.    I advised he work on optimizing correctable risk factors for vascular disease where applicable (such as HTN, lipids, smoking cessation, DM) and consider a referral to preventative cardiology.    We discussed the possible utility of a penile duplex ultrasound.  While this would not change treatment options for ED, this could lend support to a diagnosis of inflow disease versus venous leak.  If he does have narrowed cavernosal arteries, this would be strong motivation for cardiology referral.  He would like to proceed with the PDUS.       Schedule penile duplex ultrasound with 20mcg Edex injection at the [facility]. Makenzie BRUSH care coordinator was contacted to help arrange.  I will see him back on the day of the study to review results with him.    Discussed that the  recommends 100 mg as the top dose of sildenafil.  If he is not having side effects with 125 mg, I am okay with him taking this dosage.  He is not interested in ICI prescription or IPP surgery right now.      Copied cc to Consulting provider BRODIE Olivares      Thank-you for the kind consultation.  Petors Morris MD     Urological Surgeon       Additional Coding Information:    Problems:  4 -- one or more chronic illnesses with exacerbation or side effects    Data Reviewed  3 normal PSA tests    Tests ordered/pending: Penile ultrasound    Level of risk:  4 -- prescription drug management    Time spent:  24 minutes spent on the date of the encounter doing chart review, history and exam, documentation and further activities per the note                 Again, thank you for allowing me to participate in the care of your patient.       Sincerely,    Petros Morris MD

## 2024-01-17 NOTE — NURSING NOTE
Stefano Merino's goals for this visit include:   Chief Complaint   Patient presents with    New Patient     Vasculogenic erectile dysfunction       He requests these members of his care team be copied on today's visit information:       PCP: Kyle Floyd    Referring Provider:  Zay Olivares MD  [address] [place], [place] [id number]    There were no vitals taken for this visit.    Do you need any medication refills at today's visit?     Bailee Broderick LPN on 1/17/2024 at 1:16 PM

## 2024-01-17 NOTE — PROGRESS NOTES
I am seeing Mr. Stefano Merino in consultation from Dr. Olivares for evaluation of erectile dysfunction.    HPI:  Mr Merino is a very nice 63 year old man with complaints of erectile dysfunction for the last half decade.  Started sildenafil about 5-6 years ago for ED.    At this time, taking 125mg sildenafil.  This is working well for him, without bothersome side effects.  He uses the sildenafil pretty frequently, uses 3-4x/week.    Notes difficulty both in getting and maintaining erections.  Some premature ejaculation also.    He's signed up also for low intensity shockwave therapy, has not started this yet.  I discussed that the American urologic Association does not recommend these treatments.  Cost is not supported by the degree of expected benefit.    ED/Vascular disease risk factors:  HTN:   No  Hyperlipidemia: yes, treated   Smoking: no    DM: no   Cardiovascular disease: None  known  Meds associated with ED that he's taking: no  Anxiety/anger/depression: no   Penile Plaques or curvature: slight left curve.   Testosterone checked 18 months ago and was normal.      PAST MEDICAL HX:  Past Medical History:   Diagnosis Date     Colon polyps      Hyperlipidemia over fiftenn years    HAve not had to medicate     Seasonal allergies        PAST SURG HX:  Past Surgical History:   Procedure Laterality Date     CHOLECYSTECTOMY      hx polyps     LASIK Bilateral      SOFT TISSUE SURGERY      benign lump removed from leg        FAMILY HX:  Family History   Problem Relation Age of Onset     Breast Cancer Mother 75     Hypertension Mother      Angina Father      Breast Cancer Sister      Depression Sister      Cataracts Daughter      Glaucoma No family hx of      Macular Degeneration No family hx of      Skin Cancer No family hx of        SOCIAL HX:  Social History     Tobacco Use     Smoking status: Never     Smokeless tobacco: Never   Substance Use Topics     Alcohol use: Yes     Comment: One drink per week     Drug use:  Never       MEDICATIONS:  Only taking sildenafil and statin at this time.       ALLERGIES:  Patient has no known allergies.      GENERAL PHYSICAL EXAM:   Constitutional: No acute distress. Well nourished.   PSYCH: normal mood and affect.  NEURO: normal gait, no focal deficits.   EYES: anicteric, EOMI, PERR.  CARDIOPULMONARY: breathing non-labored, pulse regular rate/rhythm, no peripheral edema.  GI: Abdomen soft, non-tender, nondistended    MUSCULOSKELETAL: normal limb proportions, no muscle wasting, no contractures.  SKIN: Normal virilized hair distribution, no lesions, warts or rashes over genitalia, abdomen extremities or face.  HEME/LYMPH: no ecchymosis, no lymphadenopathy in groin, no lymphedema.     EXAM:  Phallus  circumcised, meatus adequate, no plaques palpated.   Left testis descended , size is normal  , consistency is normal . No intra-testicular masses.   Right testis descended , size is normal  , consistency is normal . No intra-testicular masses.   Epididymes present, non-tender, not-enlarged.   Cord structures not remarkable.     Prostate exam: deferred     Imaging/labs:  Lab Results   Component Value Date    CR 1.11 05/09/2023    CR 0.92 03/28/2022    CR 0.98 07/30/2020     Lab Results   Component Value Date    PSA 0.95 05/09/2023    PSA 1.09 03/28/2022    PSA 0.94 07/30/2020         ASSESSMENT:     - Erectile Dysfunction (ED): discussed options including VCD, ICI, MUSE, PDE5, and IPP with Mr. Merino .     Normal PSA values      PLAN:    - Erectile Dysfunction (ED): discussed options including VCD, ICI, MUSE, PDE5, and IPP with Mr. Merino.  Discussed that arterial disease is the most common cause of ED in the 74-20-vwbl-old age group, but he could have a component of venoocclusive dysfunction also.  He would like to do further objective testing.      I discussed that ED is a strong predictor for cardiovascular disease and cardiac events, and I am concerned that he could be at risk for a cardiac  event in the future.    I advised he work on optimizing correctable risk factors for vascular disease where applicable (such as HTN, lipids, smoking cessation, DM) and consider a referral to preventative cardiology.    We discussed the possible utility of a penile duplex ultrasound.  While this would not change treatment options for ED, this could lend support to a diagnosis of inflow disease versus venous leak.  If he does have narrowed cavernosal arteries, this would be strong motivation for cardiology referral.  He would like to proceed with the PDUS.       Schedule penile duplex ultrasound with 20mcg Edex injection at the [facility]. Makenzie BRUSH care coordinator was contacted to help arrange.  I will see him back on the day of the study to review results with him.    Discussed that the  recommends 100 mg as the top dose of sildenafil.  If he is not having side effects with 125 mg, I am okay with him taking this dosage.  He is not interested in ICI prescription or IPP surgery right now.      Copied cc to Consulting provider BRODIE Olivares      Thank-you for the kind consultation.  Petros Morris MD     Urological Surgeon       Additional Coding Information:    Problems:  4 -- one or more chronic illnesses with exacerbation or side effects    Data Reviewed  3 normal PSA tests    Tests ordered/pending: Penile ultrasound    Level of risk:  4 -- prescription drug management    Time spent:  24 minutes spent on the date of the encounter doing chart review, history and exam, documentation and further activities per the note

## 2024-01-19 ENCOUNTER — TELEPHONE (OUTPATIENT)
Dept: UROLOGY | Facility: CLINIC | Age: 64
End: 2024-01-19
Payer: COMMERCIAL

## 2024-01-19 NOTE — TELEPHONE ENCOUNTER
Left message advising pt to call me back directly to schedule penile US for Dr. Morris. Provided direct number.

## 2024-01-19 NOTE — TELEPHONE ENCOUNTER
----- Message from Makenzie Morgan RN sent at 1/18/2024  1:47 PM CST -----  Please call to schedule this patient for DUS and see me after   thanks  ----- Message -----  From: Petros Morris MD  Sent: 1/17/2024   1:58 PM CST  To: Makenzie Morgan RN      Please help schedule penile duplex ultrasound.  Order is in .  Would use 20mcg Edex for him.

## 2024-01-26 ENCOUNTER — ALLIED HEALTH/NURSE VISIT (OUTPATIENT)
Dept: UROLOGY | Facility: CLINIC | Age: 64
End: 2024-01-26
Payer: COMMERCIAL

## 2024-01-26 ENCOUNTER — TELEPHONE (OUTPATIENT)
Dept: UROLOGY | Facility: CLINIC | Age: 64
End: 2024-01-26

## 2024-01-26 ENCOUNTER — ANCILLARY PROCEDURE (OUTPATIENT)
Dept: ULTRASOUND IMAGING | Facility: CLINIC | Age: 64
End: 2024-01-26
Attending: UROLOGY
Payer: COMMERCIAL

## 2024-01-26 DIAGNOSIS — N52.9 ED (ERECTILE DYSFUNCTION): ICD-10-CM

## 2024-01-26 DIAGNOSIS — N52.9 VASCULOGENIC ERECTILE DYSFUNCTION, UNSPECIFIED VASCULOGENIC ERECTILE DYSFUNCTION TYPE: ICD-10-CM

## 2024-01-26 DIAGNOSIS — N52.9 VASCULOGENIC ERECTILE DYSFUNCTION, UNSPECIFIED VASCULOGENIC ERECTILE DYSFUNCTION TYPE: Primary | ICD-10-CM

## 2024-01-26 PROCEDURE — 99207 PR NON-BILLABLE SERV PER CHARTING: CPT

## 2024-01-26 PROCEDURE — 93980 PENILE VASCULAR STUDY: CPT | Performed by: RADIOLOGY

## 2024-01-26 NOTE — PROGRESS NOTES
Chief Complaint   Patient presents with    Clinic Care Coordination - Face To Face     DUS in xray-Arturo   Injection teaching with patient's wife        Patient Active Problem List   Diagnosis    Erectile dysfunction of organic origin    Allergic rhinitis    Dyslipidemia       No Known Allergies    Current Outpatient Medications   Medication Sig Dispense Refill    alprostadil (EDEX) 20 MCG kit 20 mcg by Intracavitary route as needed for erectile dysfunction use no more than 3 times per week 2 kit 4    atorvastatin (LIPITOR) 10 MG tablet Take 1 tablet (10 mg) by mouth daily 90 tablet 3    cetirizine (ZYRTEC) 10 MG tablet Take 10 mg by mouth daily      Cholecalciferol (VITAMIN D3) 25 MCG (1000 UT) CAPS Take 1,000 Units by mouth      doxylamine (UNISOM) 25 MG TABS tablet Take 25 mg by mouth At Bedtime      fluocinonide (LIDEX) 0.05 % external cream Apply topically 2 times daily As needed for rash 60 g 3    Multiple Vitamin (MULTIVITAMIN ADULT PO)       sildenafil (VIAGRA) 100 MG tablet Take 1 tablet (100 mg) by mouth daily as needed (sexual activity) 30 tablet 1    sildenafil (VIAGRA) 100 MG tablet Take 1 tablet (100 mg) by mouth daily as needed      triamcinolone (KENALOG) 0.025 % external ointment Apply topically 2 times daily 454 g 1    vardenafil (LEVITRA) 20 MG tablet Take 1 tablet (20 mg) by mouth daily as needed (E.D.) 20 tablet 4       Social History     Tobacco Use    Smoking status: Never    Smokeless tobacco: Never   Substance Use Topics    Alcohol use: Yes     Comment: One drink per week    Drug use: Never       Stefano Merino comes into clinic today at the request of No ref. provider found for intracavernosal injection teaching.    Diagnosis: erectile dysfunction    This service provided today was under the direct supervision of Dr Morris, who was available if needed.    Stefano Merino presents to clinic for Edex injection teaching.  Edex informational patient packet was read and reviewed in clinic by  patient.  Reviewed usage and possible side effects.  Questions answered appropriately.  This writer injected 20 mcg in radiology department   After 20 minutes, patient reassessed.  Patient rates his erection a 10/10. Patient stated that it would be firm enough for penetration.  Patient educated on alternating sites for injection, left and right side of shaft. Also discussed not to use more than 3 times per week, at least 24 hours between each injection. Also discussed prolonged erections and need to go to ER if that happens.  Patient verbalized understanding. No further questions.  Patient to call if he has any questions or concerns. Discussed what to do when injection.  Prescription for Edex 20 mcg.  Patient's erection was completely gone   The following medication was given:     MEDICATION:  Edex  ROUTE:  intracavity  SITE: right penis   DOSE: 20 mcg  LOT #: [id number]  : [employer]  EXPIRATION DATE: 11/2025  NDC#: 12754-669-44   Was there drug waste? No    Prior to injection, verified patient identity using patient's name and date of birth.  Due to injection administration, patient instructed to remain in clinic for 15 minutes  afterwards, and to report any adverse reaction to me immediately.        Makenzie Morgan RN, BSN  1/26/2024  3:02 PM

## 2024-01-27 NOTE — RESULT ENCOUNTER NOTE
Dear Stefano,     Here are your recent results.     The penile duplex ultrasound results are overall looking normal.  There is evidence of good arterial flow on both sides, and no evidence of venoocclusive dysfunction.  The main finding in my book is good arterial flow, this helps exclude severe arterial disease/atherosclerosis.    I would continue with the oral medications for ED treatment.  If these are not successful in the future, we could move to an injectable agent.    Please let us know if you have any questions or concerns.     Mera GIBBS

## 2024-01-29 ENCOUNTER — TELEPHONE (OUTPATIENT)
Dept: UROLOGY | Facility: CLINIC | Age: 64
End: 2024-01-29
Payer: COMMERCIAL

## 2024-01-29 DIAGNOSIS — N52.9 ED (ERECTILE DYSFUNCTION): Primary | ICD-10-CM

## 2024-01-29 DIAGNOSIS — N52.9 VASCULOGENIC ERECTILE DYSFUNCTION, UNSPECIFIED VASCULOGENIC ERECTILE DYSFUNCTION TYPE: ICD-10-CM

## 2024-01-29 NOTE — TELEPHONE ENCOUNTER
Patient calling to see he can increase the amount of kits sent to his pharmacy. Patient would like to get 3 kits which he will have 6 doses a month  Sent script to [facility] Morgan, RN, BSN  Care Coordinator Urology  [facility], [place]  Urology Clinic  [phone]

## 2024-02-08 NOTE — TELEPHONE ENCOUNTER
PA Initiation    Medication: EDEX 20 MCG IC KIT  Insurance Company: [facility] - Phone [phone] Fax [phone]  Pharm[facility]illing the Rx[id number] - [place], [place] - [address] AT [facility] [address]  [address]  Filling Pharmacy Phone: [phone]  Filling Pharmacy Fax:    Start Date: 2/8/2024

## 2024-02-13 NOTE — TELEPHONE ENCOUNTER
PRIOR AUTHORIZATION DENIED    Medication: EDEX 20 MCG IC KIT  Insurance Company: [employer] [place] - Phone [phone] Fax [phone]  Denial Date: 2/13/2024  Denial Reason(s): Medication is Excluded      Appeal Information: N/A  Patient Notified: No

## 2024-02-15 DIAGNOSIS — N52.9 VASCULOGENIC ERECTILE DYSFUNCTION, UNSPECIFIED VASCULOGENIC ERECTILE DYSFUNCTION TYPE: Primary | ICD-10-CM

## 2024-04-15 ENCOUNTER — TELEPHONE (OUTPATIENT)
Dept: UROLOGY | Facility: CLINIC | Age: 64
End: 2024-04-15
Payer: COMMERCIAL

## 2024-04-15 NOTE — TELEPHONE ENCOUNTER
Patient calling to say he is not getting adequate erection and not lasting long enough. 6-7/10  Patient would like to know if he can have Cialis for now. The problem is he is taking 40 mg.   He wants to also know if he can get Trimix instead of alprostadil?  Let me know which dose he should start with     Makenzie

## 2024-04-16 DIAGNOSIS — N52.9 ED (ERECTILE DYSFUNCTION): Primary | ICD-10-CM

## 2024-04-24 NOTE — TELEPHONE ENCOUNTER
Notified patient of trimix # 4   Patient also mentioned he wants to use Cialis when he travels for work. This writer will send Dr Morris a message to see if he can prescribe Cialis and max dose    Makenzie Morgan, RN, BSN  Care Coordinator Urology  [facility], [place]  Urology Clinic  [phone]

## 2024-04-26 DIAGNOSIS — N52.9 ED (ERECTILE DYSFUNCTION): Primary | ICD-10-CM

## 2024-04-26 RX ORDER — TADALAFIL 20 MG/1
20 TABLET ORAL EVERY 24 HOURS
Qty: 30 TABLET | Refills: 0 | Status: SHIPPED | OUTPATIENT
Start: 2024-04-26 | End: 2024-05-26

## 2024-04-26 RX ORDER — TADALAFIL 20 MG/1
20 TABLET ORAL EVERY 24 HOURS
Qty: 30 TABLET | Refills: 11 | Status: SHIPPED | OUTPATIENT
Start: 2024-04-26

## 2024-04-26 NOTE — TELEPHONE ENCOUNTER
Notified the patient of medication being sent. Patient would like to have his one script go to [facility] [employer] for long term use     Makenzie Morgan, RN, BSN  Care Coordinator Urology  [facility], [place]  Urology Clinic  [phone]

## 2024-07-14 ENCOUNTER — HEALTH MAINTENANCE LETTER (OUTPATIENT)
Age: 64
End: 2024-07-14

## 2024-07-24 ENCOUNTER — TELEPHONE (OUTPATIENT)
Dept: INTERNAL MEDICINE | Facility: CLINIC | Age: 64
End: 2024-07-24
Payer: COMMERCIAL

## 2024-07-24 DIAGNOSIS — R05.9 COUGH, UNSPECIFIED TYPE: Primary | ICD-10-CM

## 2024-07-24 RX ORDER — ALBUTEROL SULFATE 90 UG/1
2 AEROSOL, METERED RESPIRATORY (INHALATION) 4 TIMES DAILY PRN
Qty: 18 G | Refills: 1 | Status: SHIPPED | OUTPATIENT
Start: 2024-07-24

## 2024-07-25 NOTE — TELEPHONE ENCOUNTER
Stefano requests advice on how to manage a cough noted over the past 3-4 weeks.  Initially accompanied by URI symptoms, which have subsided.  He denies, sinus congestion, post-nasal drainage, and symptoms of GERD.  No wheezing, dyspnea, or chest discomfort.  Occasional production of modest amounts of yellow sputum, but equally often notes a dry, non-productive cough.    I recommended treatment with albuterol MDI, 2 puffs qid for up to 2 weeks to address presumed post-infectious bronchospasm.  He agrees to seek medical evaluation in the event of progressive or persistent symptoms.

## 2025-01-06 DIAGNOSIS — Z86.0101 HISTORY OF ADENOMATOUS POLYP OF COLON: Primary | ICD-10-CM

## 2025-01-13 ENCOUNTER — TELEPHONE (OUTPATIENT)
Dept: GASTROENTEROLOGY | Facility: CLINIC | Age: 65
End: 2025-01-13
Payer: COMMERCIAL

## 2025-01-13 NOTE — TELEPHONE ENCOUNTER
"Endoscopy Scheduling Screen    Have you had any respiratory illness or flu-like symptoms in the last 10 days?  No    What is your communication preference for Instructions and/or Bowel Prep?   MyChart    What insurance is in the chart?  Other:  [employer]    Ordering/Referring Provider: Kyle Floyd MD in [facility] IM SIGNATURE PROGRAM   (If ordering provider performs procedure, schedule with ordering provider unless otherwise instructed. )    BMI: Estimated body mass index is 25.07 kg/m  as calculated from the following:    Height as of 12/4/23: 1.854 m (6' 1\").    Weight as of 12/4/23: 86.2 kg (190 lb).     Sedation Ordered  moderate sedation.   If patient BMI > 50 do not schedule in [facility].    If patient BMI > 45 do not schedule at [facility].    Are you taking methadone or Suboxone?  NO, No RN review required.    Have you been diagnosed and are being treated for severe PTSD or severe anxiety?  NO, No RN review required.    Are you taking any prescription medications for pain 3 or more times per week?   NO, No RN review required.    Do you have a history of malignant hyperthermia?  No    (Females) Are you currently pregnant?   NA     Have you been diagnosed or told you have pulmonary hypertension?   No    Do you have an LVAD?  No    Have you been told you have moderate to severe sleep apnea?  No.    Have you been told you have COPD, asthma, or any other lung disease?  No    Do you have any heart conditions?  No     Have you ever had or are you waiting for an organ transplant?  No. Continue scheduling, no site restrictions.    Have you had a stroke or transient ischemic attack (TIA aka \"mini stroke\" in the last 6 months?   No    Have you been diagnosed with or been told you have cirrhosis of the liver?   No.    Are you currently on dialysis?   No    Do you need assistance transferring?   No    BMI: Estimated body mass index is 25.07 kg/m  as calculated from the following:    Height as of 12/4/23: 1.854 m (6' 1\").    " Weight as of 12/4/23: 86.2 kg (190 lb).     Is patients BMI > 40 and scheduling location [facility]?  No    Do you take an injectable or oral medication for weight loss or diabetes (excluding insulin)?  No    Do you take the medication Naltrexone?  No    Do you take blood thinners?  No       Prep   Are you currently on dialysis or do you have chronic kidney disease?  No    Do you have a diagnosis of diabetes?  No    Do you have a diagnosis of cystic fibrosis (CF)?  No    On a regular basis do you go 3 -5 days between bowel movements?  No    BMI > 40?  No    Preferred Pharmacy:    [id number] - [place], [place] - [address] [facility] OF [address] to [facility] [address]  [address]  [place] [address]  Phone: [phone] Fax: [phone]    Final Scheduling Details     Procedure scheduled  Colonoscopy    Surgeon:  Kyle     Date of procedure:  1.31.25     Pre-OP / PAC:   No - Not required for this site.    Location  [facility] - [facility] - Patient preference.    Sedation   Moderate Sedation - Per order.      Patient Reminders:   You will receive a call from a Nurse to review instructions and health history.  This assessment must be completed prior to your procedure.  Failure to complete the Nurse assessment may result in the procedure being cancelled.      On the day of your procedure, please designate an adult(s) who can drive you home stay with you for the next 24 hours. The medicines used in the exam will make you sleepy. You will not be able to drive.      You cannot take public transportation, ride share services, or non-medical taxi service without a responsible caregiver.  Medical transport services are allowed with the requirement that a responsible caregiver will receive you at your destination.  We require that drivers and caregivers are confirmed prior to your procedure.

## 2025-01-29 ENCOUNTER — TELEPHONE (OUTPATIENT)
Dept: GASTROENTEROLOGY | Facility: CLINIC | Age: 65
End: 2025-01-29
Payer: COMMERCIAL

## 2025-01-29 NOTE — TELEPHONE ENCOUNTER
Left voicemail of arrival time of 7:00 AM.     [employer] message sent with updated arrival time.

## 2025-01-31 ENCOUNTER — HOSPITAL ENCOUNTER (OUTPATIENT)
Facility: AMBULATORY SURGERY CENTER | Age: 65
Discharge: HOME | End: 2025-01-31
Attending: INTERNAL MEDICINE | Admitting: INTERNAL MEDICINE
Payer: COMMERCIAL

## 2025-01-31 VITALS
TEMPERATURE: 97.5 F | DIASTOLIC BLOOD PRESSURE: 90 MMHG | OXYGEN SATURATION: 97 % | SYSTOLIC BLOOD PRESSURE: 125 MMHG | RESPIRATION RATE: 16 BRPM | HEART RATE: 72 BPM

## 2025-01-31 LAB — COLONOSCOPY: NORMAL

## 2025-01-31 PROCEDURE — 45378 DIAGNOSTIC COLONOSCOPY: CPT

## 2025-01-31 PROCEDURE — G8918 PT W/O PREOP ORDER IV AB PRO: HCPCS

## 2025-01-31 PROCEDURE — G8907 PT DOC NO EVENTS ON DISCHARG: HCPCS

## 2025-01-31 RX ORDER — ONDANSETRON 2 MG/ML
4 INJECTION INTRAMUSCULAR; INTRAVENOUS
Status: DISCONTINUED | OUTPATIENT
Start: 2025-01-31 | End: 2025-02-01 | Stop reason: HOSPADM

## 2025-01-31 RX ORDER — FENTANYL CITRATE 50 UG/ML
INJECTION, SOLUTION INTRAMUSCULAR; INTRAVENOUS PRN
Status: DISCONTINUED | OUTPATIENT
Start: 2025-01-31 | End: 2025-01-31 | Stop reason: HOSPADM

## 2025-01-31 RX ORDER — PROCHLORPERAZINE MALEATE 10 MG
10 TABLET ORAL EVERY 6 HOURS PRN
Status: DISCONTINUED | OUTPATIENT
Start: 2025-01-31 | End: 2025-02-01 | Stop reason: HOSPADM

## 2025-01-31 RX ORDER — FLUMAZENIL 0.1 MG/ML
0.2 INJECTION, SOLUTION INTRAVENOUS
Status: ACTIVE | OUTPATIENT
Start: 2025-01-31 | End: 2025-01-31

## 2025-01-31 RX ORDER — ONDANSETRON 4 MG/1
4 TABLET, ORALLY DISINTEGRATING ORAL EVERY 6 HOURS PRN
Status: DISCONTINUED | OUTPATIENT
Start: 2025-01-31 | End: 2025-02-01 | Stop reason: HOSPADM

## 2025-01-31 RX ORDER — LIDOCAINE 40 MG/G
CREAM TOPICAL
Status: DISCONTINUED | OUTPATIENT
Start: 2025-01-31 | End: 2025-02-01 | Stop reason: HOSPADM

## 2025-01-31 RX ORDER — NALOXONE HYDROCHLORIDE 0.4 MG/ML
0.2 INJECTION, SOLUTION INTRAMUSCULAR; INTRAVENOUS; SUBCUTANEOUS
Status: DISCONTINUED | OUTPATIENT
Start: 2025-01-31 | End: 2025-02-01 | Stop reason: HOSPADM

## 2025-01-31 RX ORDER — ONDANSETRON 2 MG/ML
4 INJECTION INTRAMUSCULAR; INTRAVENOUS EVERY 6 HOURS PRN
Status: DISCONTINUED | OUTPATIENT
Start: 2025-01-31 | End: 2025-02-01 | Stop reason: HOSPADM

## 2025-01-31 RX ORDER — NALOXONE HYDROCHLORIDE 0.4 MG/ML
0.4 INJECTION, SOLUTION INTRAMUSCULAR; INTRAVENOUS; SUBCUTANEOUS
Status: DISCONTINUED | OUTPATIENT
Start: 2025-01-31 | End: 2025-02-01 | Stop reason: HOSPADM

## 2025-01-31 NOTE — H&P
[facility] Anesthesia Pre-op History and Physical    Stefano Merino MRN# [id number]   Age: 64 year old YOB: 1960     Date of Exam 1/31/2025         Primary care provider: Kyle Floyd         Chief Complaint and/or Reason for Procedure:     History of colon polyps         Active problem list:     Patient Active Problem List    Diagnosis Date Noted    Allergic rhinitis 07/29/2020     Priority: Medium    Dyslipidemia 07/29/2020     Priority: Medium    Erectile dysfunction of organic origin 07/26/2019     Priority: Medium     Did well with viagra, 50mg in the past but doing fine without med now.              Medications (include herbals and vitamins):   Any Plavix use in the last 7 days? No     Current Outpatient Medications   Medication Sig Dispense Refill    albuterol (PROAIR HFA/PROVENTIL HFA/VENTOLIN HFA) 108 (90 Base) MCG/ACT inhaler Inhale 2 puffs into the lungs 4 times daily as needed for shortness of breath, wheezing or cough (cough) 18 g 1    alprostadil (EDEX) 20 MCG kit 20 mcg by Intracavitary route as needed for erectile dysfunction use no more than 3 times per week 3 kit 4    alprostadil (EDEX) 20 MCG kit 20 mcg by Intracavitary route as needed for erectile dysfunction use no more than 3 times per week 2 kit 4    cetirizine (ZYRTEC) 10 MG tablet Take 10 mg by mouth daily      Cholecalciferol (VITAMIN D3) 25 MCG (1000 UT) CAPS Take 1,000 Units by mouth      doxylamine (UNISOM) 25 MG TABS tablet Take 25 mg by mouth At Bedtime      Multiple Vitamin (MULTIVITAMIN ADULT PO)       Na Sulfate-K Sulfate-Mg Sulf (SUPREP BOWEL PREP) solution Split dose 2 day Regimen: The evening before you procedure at 4pm: dilute one bottle with water to a total volume of 16 oz. (up to fill line).  Then drink the entire amount.  Drink 32 ounces of water over the next hour.  If you arrive before 11am repeat both steps above at 8pm the night before procedure using the second bottle.  If you arrive  after 11am repeat both steps above at 6am morning of procedure using the second bottle. 354 mL 0    vardenafil (LEVITRA) 20 MG tablet Take 1 tablet (20 mg) by mouth daily as needed (E.D.) 20 tablet 4    COMPOUNDED NON-CONTROLLED SUBSTANCE (CMPD RX) - PHARMACY TO MIX COMPOUNDED MEDICATION Trimix # 4 Papaverine 27.6 mg/mL Phentolamine 1 mg/mL Alprostadil 40 mcg/mL Sig: inject 0.2 mL intracavernous daily as needed. Allow 24 hours between injections. Use no more than three time weekly May increase in increments of 0.1 mL up 0.8 mL 5 mL 4    COMPOUNDED NON-CONTROLLED SUBSTANCE (CMPD RX) - PHARMACY TO MIX COMPOUNDED MEDICATION Alprostadil 50 mcg inject 0.4 mL intracavitary as needed no more than three times weekly with twenty four hours between injections 10 mL 3    fluocinonide (LIDEX) 0.05 % external cream Apply topically 2 times daily As needed for rash 60 g 3    sildenafil (VIAGRA) 100 MG tablet Take 1 tablet (100 mg) by mouth daily as needed (sexual activity) 30 tablet 1    sildenafil (VIAGRA) 100 MG tablet Take 1 tablet (100 mg) by mouth daily as needed      tadalafil (ADCIRCA/CIALIS) 20 MG tablet Take 1 tablet (20 mg) by mouth every 24 hours for 30 days 30 tablet 0    tadalafil (ADCIRCA/CIALIS) 20 MG tablet Take 1 tablet (20 mg) by mouth every 24 hours 30 tablet 11    triamcinolone (KENALOG) 0.025 % external ointment Apply topically 2 times daily 454 g 1     Current Facility-Administered Medications   Medication Dose Route Frequency Provider Last Rate Last Admin    alprostadil (EDEX) injection 20 mcg  20 mcg Intracavitary Once Petros Morris MD        lidocaine (LMX4) kit   Topical Q1H PRN Mona Wright, DO        lidocaine 1 % 0.1-1 mL  0.1-1 mL Other Q1H PRN McBeath, Mona, DO        ondansetron (ZOFRAN) injection 4 mg  4 mg Intravenous Once PRN McBerobinson, Mona, DO        sodium chloride (PF) 0.9% PF flush 3 mL  3 mL Intracatheter Q8H McToby Mona, DO        sodium chloride (PF) 0.9% PF flush 3 mL   3 mL Intracatheter q1 min prn Mona Wright DO                 Allergies:    No Known Allergies  Allergy to Latex? No  Allergy to tape?   No  Intolerances:             Physical Exam:   All vitals have been reviewed  Patient Vitals for the past 8 hrs:   BP Temp Temp src Pulse Resp SpO2   01/31/25 0714 123/87 98.2  F (36.8  C) Temporal 66 16 94 %     No intake/output data recorded.  Lungs:   no increased work of breathing     Cardiovascular:   RRR             Lab / Radiology Results:            Anesthetic risk and/or ASA classification:   2    Mona Wright DO

## 2025-05-15 DIAGNOSIS — N52.9 ED (ERECTILE DYSFUNCTION): ICD-10-CM

## 2025-05-15 RX ORDER — TADALAFIL 20 MG/1
20 TABLET ORAL EVERY 24 HOURS
Qty: 30 TABLET | Refills: 3 | Status: SHIPPED | OUTPATIENT
Start: 2025-05-15 | End: 2025-05-15

## 2025-05-15 RX ORDER — TADALAFIL 20 MG/1
20 TABLET ORAL EVERY 24 HOURS
Qty: 30 TABLET | Refills: 6 | Status: SHIPPED | OUTPATIENT
Start: 2025-05-15

## 2025-05-15 NOTE — ADDENDUM NOTE
Acute on chronic  Likely chronic from constipation, combined with now acute component of UTI  CT abdomen pelvis unremarkable, demonstrates diverticulosis without diverticulitis, not that constipated on it    Addressed UTI as above  As needed and scheduled Tylenol  Opioids for breakthrough pain, will start low-dose to avoid future constipation  If renal function recovers consider using NSAIDs instead of opioids   Addended by: JAYSHREE HUANG on: 5/15/2025 05:16 PM     Modules accepted: Orders

## 2025-07-19 ENCOUNTER — HEALTH MAINTENANCE LETTER (OUTPATIENT)
Age: 65
End: 2025-07-19

## (undated) DEVICE — KIT ENDO FIRST STEP DISINFECTANT 200ML W/POUCH EP-4

## (undated) DEVICE — LIFTER SURGICAL ASCENDO SUBMUCOSAL LIFT AGENT BX00712934

## (undated) DEVICE — PAD CHUX UNDERPAD 23X24" 7136

## (undated) DEVICE — PREP CHLORAPREP 26ML TINTED ORANGE  260815

## (undated) RX ORDER — FENTANYL CITRATE 50 UG/ML
INJECTION, SOLUTION INTRAMUSCULAR; INTRAVENOUS
Status: DISPENSED
Start: 2019-07-29

## (undated) RX ORDER — FENTANYL CITRATE 50 UG/ML
INJECTION, SOLUTION INTRAMUSCULAR; INTRAVENOUS
Status: DISPENSED
Start: 2025-01-31